# Patient Record
Sex: FEMALE | Race: WHITE | NOT HISPANIC OR LATINO | Employment: FULL TIME
[De-identification: names, ages, dates, MRNs, and addresses within clinical notes are randomized per-mention and may not be internally consistent; named-entity substitution may affect disease eponyms.]

---

## 2018-11-20 PROCEDURE — 87077 CULTURE AEROBIC IDENTIFY: CPT | Performed by: EMERGENCY MEDICINE

## 2018-11-20 PROCEDURE — 87086 URINE CULTURE/COLONY COUNT: CPT | Performed by: EMERGENCY MEDICINE

## 2018-11-20 PROCEDURE — 87186 SC STD MICRODIL/AGAR DIL: CPT | Performed by: EMERGENCY MEDICINE

## 2019-08-12 ENCOUNTER — TELEPHONE (OUTPATIENT)
Dept: SCHEDULING | Age: 29
End: 2019-08-12

## 2019-08-16 ENCOUNTER — OFFICE VISIT (OUTPATIENT)
Dept: FAMILY MEDICINE | Age: 29
End: 2019-08-16

## 2019-08-16 VITALS
HEART RATE: 97 BPM | HEIGHT: 66 IN | SYSTOLIC BLOOD PRESSURE: 106 MMHG | BODY MASS INDEX: 26.84 KG/M2 | OXYGEN SATURATION: 98 % | TEMPERATURE: 97.1 F | DIASTOLIC BLOOD PRESSURE: 64 MMHG | WEIGHT: 167 LBS

## 2019-08-16 DIAGNOSIS — J30.2 SEASONAL ALLERGIES: Primary | ICD-10-CM

## 2019-08-16 PROCEDURE — 99385 PREV VISIT NEW AGE 18-39: CPT | Performed by: FAMILY MEDICINE

## 2019-08-16 RX ORDER — CETIRIZINE HYDROCHLORIDE 5 MG/1
5 TABLET ORAL DAILY
COMMUNITY
End: 2019-08-16 | Stop reason: SDUPTHER

## 2019-08-16 RX ORDER — CETIRIZINE HYDROCHLORIDE 5 MG/1
5 TABLET ORAL DAILY
Qty: 30 TABLET | Refills: 1 | Status: SHIPPED | OUTPATIENT
Start: 2019-08-16

## 2019-08-16 RX ORDER — MONTELUKAST SODIUM 10 MG/1
10 TABLET ORAL DAILY
Qty: 30 TABLET | Refills: 1 | Status: SHIPPED | OUTPATIENT
Start: 2019-08-16 | End: 2019-09-23 | Stop reason: SDUPTHER

## 2019-08-16 RX ORDER — MONTELUKAST SODIUM 10 MG/1
10 TABLET ORAL
COMMUNITY
Start: 2013-02-28 | End: 2019-08-16 | Stop reason: SDUPTHER

## 2019-08-16 SDOH — HEALTH STABILITY: PHYSICAL HEALTH: ON AVERAGE, HOW MANY DAYS PER WEEK DO YOU ENGAGE IN MODERATE TO STRENUOUS EXERCISE (LIKE A BRISK WALK)?: 7 DAYS

## 2019-08-16 SDOH — HEALTH STABILITY: MENTAL HEALTH: HOW OFTEN DO YOU HAVE A DRINK CONTAINING ALCOHOL?: NEVER

## 2019-08-16 SDOH — HEALTH STABILITY: PHYSICAL HEALTH: ON AVERAGE, HOW MANY MINUTES DO YOU ENGAGE IN EXERCISE AT THIS LEVEL?: 60 MIN

## 2019-08-16 ASSESSMENT — PATIENT HEALTH QUESTIONNAIRE - PHQ9
SUM OF ALL RESPONSES TO PHQ9 QUESTIONS 1 AND 2: 0
SUM OF ALL RESPONSES TO PHQ9 QUESTIONS 1 AND 2: 0
2. FEELING DOWN, DEPRESSED OR HOPELESS: NOT AT ALL
1. LITTLE INTEREST OR PLEASURE IN DOING THINGS: NOT AT ALL

## 2019-08-24 PROBLEM — Z00.00 HEALTH MAINTENANCE EXAMINATION: Status: ACTIVE | Noted: 2019-08-24

## 2019-08-24 PROBLEM — J30.2 SEASONAL ALLERGIES: Status: ACTIVE | Noted: 2019-08-24

## 2019-09-23 DIAGNOSIS — J30.2 SEASONAL ALLERGIES: ICD-10-CM

## 2019-09-23 RX ORDER — MONTELUKAST SODIUM 10 MG/1
10 TABLET ORAL DAILY
Qty: 90 TABLET | Refills: 1 | Status: SHIPPED | OUTPATIENT
Start: 2019-09-23 | End: 2020-05-01 | Stop reason: SDUPTHER

## 2020-01-02 ENCOUNTER — OFFICE VISIT (OUTPATIENT)
Dept: INTERNAL MEDICINE | Age: 30
End: 2020-01-02

## 2020-01-02 ENCOUNTER — TELEPHONE (OUTPATIENT)
Dept: SCHEDULING | Age: 30
End: 2020-01-02

## 2020-01-02 VITALS
HEART RATE: 64 BPM | BODY MASS INDEX: 27.76 KG/M2 | TEMPERATURE: 97 F | DIASTOLIC BLOOD PRESSURE: 75 MMHG | WEIGHT: 172 LBS | SYSTOLIC BLOOD PRESSURE: 125 MMHG

## 2020-01-02 DIAGNOSIS — B96.89 ACUTE BACTERIAL RHINOSINUSITIS: Primary | ICD-10-CM

## 2020-01-02 DIAGNOSIS — J02.9 ACUTE PHARYNGITIS, UNSPECIFIED ETIOLOGY: ICD-10-CM

## 2020-01-02 DIAGNOSIS — J01.90 ACUTE BACTERIAL RHINOSINUSITIS: Primary | ICD-10-CM

## 2020-01-02 PROCEDURE — 99203 OFFICE O/P NEW LOW 30 MIN: CPT | Performed by: STUDENT IN AN ORGANIZED HEALTH CARE EDUCATION/TRAINING PROGRAM

## 2020-01-02 RX ORDER — DOXYCYCLINE HYCLATE 100 MG/1
100 CAPSULE ORAL 2 TIMES DAILY
Qty: 14 CAPSULE | Refills: 0 | Status: SHIPPED | OUTPATIENT
Start: 2020-01-02 | End: 2020-01-09

## 2020-01-02 RX ORDER — ESCITALOPRAM OXALATE 10 MG/1
TABLET ORAL
Refills: 0 | COMMUNITY
Start: 2019-10-29

## 2020-01-02 RX ORDER — BENZONATATE 100 MG/1
100 CAPSULE ORAL 3 TIMES DAILY PRN
Qty: 21 CAPSULE | Refills: 0 | Status: SHIPPED | OUTPATIENT
Start: 2020-01-02

## 2020-01-02 ASSESSMENT — ENCOUNTER SYMPTOMS
WEAKNESS: 0
FATIGUE: 1
SINUS PAIN: 1
VOICE CHANGE: 0
WHEEZING: 1
SORE THROAT: 1
NAUSEA: 0
RHINORRHEA: 1
CONSTIPATION: 0
ABDOMINAL PAIN: 0
COUGH: 1
SINUS PRESSURE: 1
HEADACHES: 0
COLOR CHANGE: 0
SHORTNESS OF BREATH: 0
VOMITING: 0
DIARRHEA: 0

## 2020-01-02 ASSESSMENT — PATIENT HEALTH QUESTIONNAIRE - PHQ9
1. LITTLE INTEREST OR PLEASURE IN DOING THINGS: NOT AT ALL
SUM OF ALL RESPONSES TO PHQ9 QUESTIONS 1 AND 2: 0
SUM OF ALL RESPONSES TO PHQ9 QUESTIONS 1 AND 2: 0
2. FEELING DOWN, DEPRESSED OR HOPELESS: NOT AT ALL

## 2020-01-10 ENCOUNTER — TELEPHONE (OUTPATIENT)
Dept: SCHEDULING | Age: 30
End: 2020-01-10

## 2020-01-23 ENCOUNTER — APPOINTMENT (OUTPATIENT)
Dept: FAMILY MEDICINE | Age: 30
End: 2020-01-23

## 2020-05-01 ENCOUNTER — V-VISIT (OUTPATIENT)
Dept: FAMILY MEDICINE | Age: 30
End: 2020-05-01

## 2020-05-01 ENCOUNTER — TELEPHONE (OUTPATIENT)
Dept: SCHEDULING | Age: 30
End: 2020-05-01

## 2020-05-01 DIAGNOSIS — B34.9 VIRAL ILLNESS: Primary | ICD-10-CM

## 2020-05-01 DIAGNOSIS — Z87.09 HISTORY OF ASTHMA: ICD-10-CM

## 2020-05-01 DIAGNOSIS — J30.2 SEASONAL ALLERGIES: ICD-10-CM

## 2020-05-01 PROCEDURE — 99213 OFFICE O/P EST LOW 20 MIN: CPT | Performed by: FAMILY MEDICINE

## 2020-05-01 RX ORDER — MONTELUKAST SODIUM 10 MG/1
10 TABLET ORAL DAILY
Qty: 90 TABLET | Refills: 1 | Status: SHIPPED | OUTPATIENT
Start: 2020-05-01

## 2020-05-01 RX ORDER — ALBUTEROL SULFATE 90 UG/1
2 AEROSOL, METERED RESPIRATORY (INHALATION) EVERY 4 HOURS PRN
Qty: 1 INHALER | Refills: 3 | Status: SHIPPED | OUTPATIENT
Start: 2020-05-01

## 2020-05-01 SDOH — HEALTH STABILITY: MENTAL HEALTH: HOW OFTEN DO YOU HAVE A DRINK CONTAINING ALCOHOL?: NEVER

## 2020-05-01 ASSESSMENT — PATIENT HEALTH QUESTIONNAIRE - PHQ9
SUM OF ALL RESPONSES TO PHQ9 QUESTIONS 1 AND 2: 0
1. LITTLE INTEREST OR PLEASURE IN DOING THINGS: NOT AT ALL
2. FEELING DOWN, DEPRESSED OR HOPELESS: NOT AT ALL
SUM OF ALL RESPONSES TO PHQ9 QUESTIONS 1 AND 2: 0

## 2020-05-01 ASSESSMENT — PAIN SCALES - GENERAL: PAINLEVEL: 0

## 2020-12-11 ENCOUNTER — TELEPHONE (OUTPATIENT)
Dept: SCHEDULING | Age: 30
End: 2020-12-11

## 2020-12-14 ENCOUNTER — OFFICE VISIT (OUTPATIENT)
Dept: FAMILY MEDICINE | Age: 30
End: 2020-12-14

## 2020-12-14 ENCOUNTER — TELEPHONE (OUTPATIENT)
Dept: SCHEDULING | Age: 30
End: 2020-12-14

## 2020-12-14 VITALS
WEIGHT: 177.8 LBS | SYSTOLIC BLOOD PRESSURE: 127 MMHG | DIASTOLIC BLOOD PRESSURE: 80 MMHG | HEART RATE: 62 BPM | TEMPERATURE: 97.9 F | BODY MASS INDEX: 28.7 KG/M2

## 2020-12-14 DIAGNOSIS — Q83.9 BREAST ANOMALY: Primary | ICD-10-CM

## 2020-12-14 PROCEDURE — 99214 OFFICE O/P EST MOD 30 MIN: CPT | Performed by: FAMILY MEDICINE

## 2020-12-23 ENCOUNTER — HOSPITAL ENCOUNTER (OUTPATIENT)
Dept: MAMMOGRAPHY | Age: 30
Discharge: HOME | End: 2020-12-23
Attending: FAMILY MEDICINE

## 2020-12-23 ENCOUNTER — TELEPHONE (OUTPATIENT)
Dept: SCHEDULING | Age: 30
End: 2020-12-23

## 2020-12-23 DIAGNOSIS — Q83.9 BREAST ANOMALY: ICD-10-CM

## 2020-12-23 PROCEDURE — 77066 DX MAMMO INCL CAD BI: CPT

## 2020-12-23 PROCEDURE — 76641 ULTRASOUND BREAST COMPLETE: CPT

## 2020-12-28 ENCOUNTER — TELEPHONE (OUTPATIENT)
Dept: FAMILY MEDICINE | Age: 30
End: 2020-12-28

## 2020-12-28 DIAGNOSIS — Q83.9 BREAST ANOMALY: Primary | ICD-10-CM

## 2021-01-01 ENCOUNTER — EXTERNAL RECORD (OUTPATIENT)
Dept: HEALTH INFORMATION MANAGEMENT | Age: 31
End: 2021-01-01

## 2021-01-04 ENCOUNTER — OFFICE VISIT (OUTPATIENT)
Dept: SURGERY | Age: 31
End: 2021-01-04
Attending: SURGERY

## 2021-01-04 VITALS
DIASTOLIC BLOOD PRESSURE: 75 MMHG | HEART RATE: 60 BPM | SYSTOLIC BLOOD PRESSURE: 126 MMHG | BODY MASS INDEX: 27.32 KG/M2 | HEIGHT: 66 IN | WEIGHT: 170 LBS | TEMPERATURE: 98.8 F

## 2021-01-04 DIAGNOSIS — N64.4 BREAST PAIN: Primary | ICD-10-CM

## 2021-01-04 PROCEDURE — 99212 OFFICE O/P EST SF 10 MIN: CPT

## 2021-01-04 PROCEDURE — 99203 OFFICE O/P NEW LOW 30 MIN: CPT | Performed by: SURGERY

## 2021-01-04 SDOH — HEALTH STABILITY: MENTAL HEALTH: HOW OFTEN DO YOU HAVE A DRINK CONTAINING ALCOHOL?: NEVER

## 2021-01-05 ENCOUNTER — APPOINTMENT (OUTPATIENT)
Dept: MAMMOGRAPHY | Age: 31
End: 2021-01-05
Attending: FAMILY MEDICINE

## 2021-01-05 ENCOUNTER — APPOINTMENT (OUTPATIENT)
Dept: ULTRASOUND IMAGING | Age: 31
End: 2021-01-05
Attending: FAMILY MEDICINE

## 2021-04-23 ENCOUNTER — TELEPHONE (OUTPATIENT)
Dept: SCHEDULING | Age: 31
End: 2021-04-23

## 2021-04-23 ENCOUNTER — OFFICE VISIT (OUTPATIENT)
Dept: INTERNAL MEDICINE | Age: 31
End: 2021-04-23

## 2021-04-23 DIAGNOSIS — N30.00 ACUTE CYSTITIS WITHOUT HEMATURIA: Primary | ICD-10-CM

## 2021-04-23 LAB
APPEARANCE, POC: CLEAR
BILIRUBIN, POC: NEGATIVE
COLOR, POC: YELLOW
GLUCOSE UR-MCNC: NEGATIVE MG/DL
KETONES, POC: NEGATIVE MG/DL
NITRITE, POC: NEGATIVE
OCCULT BLOOD, POC: NORMAL
PH UR: 5.5 [PH] (ref 5–7)
PROT UR-MCNC: NEGATIVE MG/DL
SP GR UR: 1.01 (ref 1–1.03)
UROBILINOGEN UR-MCNC: 0.2 MG/DL (ref 0–1)
WBC (LEUKOCYTE) ESTERASE, POC: NORMAL

## 2021-04-23 PROCEDURE — 81002 URINALYSIS NONAUTO W/O SCOPE: CPT | Performed by: INTERNAL MEDICINE

## 2021-04-23 PROCEDURE — 81001 URINALYSIS AUTO W/SCOPE: CPT | Performed by: INTERNAL MEDICINE

## 2021-04-23 PROCEDURE — 99213 OFFICE O/P EST LOW 20 MIN: CPT | Performed by: INTERNAL MEDICINE

## 2021-04-23 PROCEDURE — 87086 URINE CULTURE/COLONY COUNT: CPT | Performed by: INTERNAL MEDICINE

## 2021-04-23 RX ORDER — FLUCONAZOLE 150 MG/1
150 TABLET ORAL ONCE
Qty: 1 TABLET | Refills: 0 | Status: SHIPPED | OUTPATIENT
Start: 2021-04-23 | End: 2021-04-23

## 2021-04-23 RX ORDER — NITROFURANTOIN 25; 75 MG/1; MG/1
100 CAPSULE ORAL 2 TIMES DAILY
Qty: 10 CAPSULE | Refills: 0 | Status: SHIPPED | OUTPATIENT
Start: 2021-04-23 | End: 2021-04-28

## 2021-04-23 ASSESSMENT — PATIENT HEALTH QUESTIONNAIRE - PHQ9
SUM OF ALL RESPONSES TO PHQ9 QUESTIONS 1 AND 2: 0
1. LITTLE INTEREST OR PLEASURE IN DOING THINGS: NOT AT ALL
CLINICAL INTERPRETATION OF PHQ2 SCORE: NO FURTHER SCREENING NEEDED
SUM OF ALL RESPONSES TO PHQ9 QUESTIONS 1 AND 2: 0
2. FEELING DOWN, DEPRESSED OR HOPELESS: NOT AT ALL
CLINICAL INTERPRETATION OF PHQ9 SCORE: NO FURTHER SCREENING NEEDED

## 2021-04-23 ASSESSMENT — PAIN SCALES - GENERAL: PAINLEVEL: 1-2

## 2021-04-24 LAB
APPEARANCE UR: CLEAR
BACTERIA #/AREA URNS HPF: ABNORMAL /HPF
BILIRUB UR QL STRIP: NEGATIVE
COLOR UR: ABNORMAL
GLUCOSE UR STRIP-MCNC: NEGATIVE MG/DL
HGB UR QL STRIP: ABNORMAL
HYALINE CASTS #/AREA URNS LPF: ABNORMAL /LPF
KETONES UR STRIP-MCNC: NEGATIVE MG/DL
LEUKOCYTE ESTERASE UR QL STRIP: ABNORMAL
NITRITE UR QL STRIP: NEGATIVE
PH UR STRIP: 7 [PH] (ref 5–7)
PROT UR STRIP-MCNC: NEGATIVE MG/DL
RBC #/AREA URNS HPF: ABNORMAL /HPF
SP GR UR STRIP: <1.005 (ref 1–1.03)
SQUAMOUS #/AREA URNS HPF: ABNORMAL /HPF
UROBILINOGEN UR STRIP-MCNC: 0.2 MG/DL
WBC #/AREA URNS HPF: ABNORMAL /HPF

## 2021-04-25 LAB — BACTERIA UR CULT: NORMAL

## 2021-04-27 ENCOUNTER — APPOINTMENT (OUTPATIENT)
Dept: FAMILY MEDICINE | Age: 31
End: 2021-04-27

## 2021-05-25 VITALS
SYSTOLIC BLOOD PRESSURE: 113 MMHG | WEIGHT: 167.55 LBS | DIASTOLIC BLOOD PRESSURE: 65 MMHG | OXYGEN SATURATION: 99 % | HEART RATE: 54 BPM | TEMPERATURE: 97.9 F | BODY MASS INDEX: 26.93 KG/M2 | HEIGHT: 66 IN

## 2024-11-20 NOTE — PROGRESS NOTES
EMILY 2025, by Embryo Transfer 26w1d here today for MIKIE visit. Baby active. Denies cramping, LOF or bleeding.     Patient states she was placed on Synthroid when undergoing IVF and does not have a history of hypothyroidism. It was recommended that she continue until 12 weeks gestation. Patient self weaned from Synthroid due to nausea and is no longer taking. Caryl Ricketts ordered TSH and fT4 labs in September which were normal. Caryl recommended the patient still follow up with an endocrinologist. Patient states today she does not want to follow up with endocrinology due to the \"Synthroid only being for IVF treatment.\"     Plans natural childbirth, is signed up for prenatal classes. Is interested in hypobirthing and was given list of prenatal classes/hypnobirthing resources from [facility].     Declines flu and COVID vaccine    Instructed on 3rd trimester labs and TDAP Vaccine  Has 32 week growth US and BPP scheduled with [facility]          ICD-10-CM    1. Pregnancy resulting from assisted reproductive technology in second trimester ([facility])  O09.812       2. Obesity in pregnancy ([facility])  O99.210         Warning signs, kick counts reviewed and patient questions answered.     RTC in 2 weeks for 28 week visit.     DISHA Farley under direct supervision and in collaboration with Coral Tamez CNM   I personally performed the patient's exam and medical decision making on this date of service.  I was physically present in the room for the performance of the E/M service.  I have reviewed the FRANCY student's documentation and findings including history, Exam, and Medical Decision Making, edited the document for accuracy and verify that it represents the clinical findings and services performed.

## 2024-11-22 NOTE — TELEPHONE ENCOUNTER
Patient called in is 26 weeks pregnant, is having sharp pain on the lower right side below her belly button.   Request for a nurse to call

## 2024-11-22 NOTE — TELEPHONE ENCOUNTER
Name and  verified. Sharp and dull pain near your pelvis on the right side. Aches, when she presses it. Active fetal movement. No complaints of bleeding, loss of fluids, or cramping. No headaches, blurry vision, spots in the vision. No sex recently. Urinating and BM are normal. Patient did have excessive diarrhea last night but has been able to eat and drink okay today. This RN recommended her continuing to watch the pain and if the pain gets worse, doesn't feel baby move as much, or have any vaginal bleeding, to call us right away or be directed to the ED. Patient states understanding.

## 2024-12-05 NOTE — PROGRESS NOTES
Kitty, , is at 28w2d, here for her return OB visit.  Currently, she is feeling ok. Denies contractions VB or LOF. Endorses active fetus. Feeling anxious about diabetes test and possibility of needing to transfer out of practice. Started seeing therapist yesterday and feels that did help with her anxiety.      Vital signs and weight reviewed  See flowsheets     Assessment/Plan:   3T labs ordered and pt plans to complete today. Discussed that the 1h GTT is a screening test and if she fails she will need to complete the 3 hour test. If she were to fail the 3h test and be diagnosed as GDM she is not automatically transferred. Pt felt reassured by this information.  Tdap recommendation reviewed, pt would like to complete next visit.  28w packet provided, CBE resources reviewed as well as  recommendations    Next visit: 2 weeks    Reviewed:   Prenatal visit schedule  Recommendations and rationale for Tdap & RSV vaccine(s) in pregnancy  3rd trimester precautions and expectations   labor precautions  Kick counts  Danger signs      I have spent 20 minutes total time on the day of the encounter, including: preparing to see the patient, ordering/reviewing labs, performing a medically appropriate exam, and providing care coordination. face to face counseling, chart review, orders and coordination of care    Pt verbalized understanding. All questions answered. No barriers to learning identified

## 2024-12-05 NOTE — PATIENT INSTRUCTIONS
Understanding  Labor  Going into labor before your 37th week of pregnancy is called  labor.  labor can cause your baby to be born too soon. This can lead to a number of health problems that may affect your baby.      Before labor, the cervix is thick and closed.      In  labor, the cervix begins to efface (thin) and dilate (open).   Symptoms of  labor   If you think you’re having  labor, get medical help right away. Contractions alone don’t mean you’re in  labor. What matters more are changes in your cervix (the lower end of the uterus). Symptoms of  labor include:   Four or more contractions per hour  Strong contractions  Constant menstrual-like cramping  Low-back pain  Mucous or bloody vaginal discharge  Bleeding or spotting in the second or third trimester  Evaluating  labor   Your healthcare provider will try to find out whether you’re in  labor or whether you’re just having contractions. He or she may watch you for a few hours. The following tests may be done:   Pelvic exam to see if your cervix has effaced (thinned) and dilated (opened)  Uterine activity monitoring to detect contractions  Fetal monitoring to check the health of your baby  Ultrasound to check your baby’s size and position  Amniocentesis to check how mature your baby’s lungs are  Caring for yourself at home   If you have  contractions, but your cervix is still thick and closed, your healthcare provider may ask you to do the following at home:   Drink plenty of water.  Do fewer activities.  Rest in bed on your side.  Don't have intercourse or nipple stimulation.  When to call your healthcare provider   Call your healthcare provider if you notice any of these:   Four or more contractions per hour  Bag of water breaks  Bleeding or spotting  If you need hospital care    labor often requires that you have hospital care and complete bed rest. You may have an IV  (intravenous) line to get fluids. You may be given pills or injections to help prevent contractions. Finally, you may get medicine (corticosteroids) that helps your baby’s lungs mature more quickly.   Are you at risk?   Any pregnant woman can have  labor. It may start for no reason. But these risk factors can increase your chances:   Past  labor or past early birth  Smoking, drug, or alcohol use during pregnancy  Multiple fetuses (twins or more)  Problems with the shape of the uterus  Bleeding during the pregnancy  The dangers of  birth   A baby born too soon may have health problems. This is because the baby didn’t have enough time to mature. Some of the risks for your baby include:   Not breastfeeding or feeding well  Having immature lungs  Bleeding in the brain  Dying  Reaching term   Your goal is to get as close to term as you can before giving birth. The closer you get to term, the higher your chance of having a healthy baby. Work with your healthcare provider. Together, you can take steps that may keep you from giving birth too early.   [employer] last reviewed this educational content on 3/1/2019  © 8839-0992 [employer]. [address] [place] [address]. All rights reserved. This information is not intended as a substitute for professional medical care. Always follow your healthcare professional's instructions.        Premature Labor    Premature labor ( labor) is when symptoms of labor occur before 37 weeks of pregnancy. (This is 3 weeks before your due date.) Premature labor can lead to premature delivery. This means giving birth to your baby early. Babies need at least 37 weeks of pregnancy for all the organs to develop normally. The earlier the delivery, the greater the risks to the baby.  In most cases, the cause of premature labor is unknown. But certain factors may make the problem more likely. These include:  History of premature labor with other  pregnancies  Smoking  Alcohol or substance abuse  Low pre-pregnancy weight or weight gain during pregnancy  Short time period between pregnancies  Being pregnant with twins, triplets, or more  History of certain types of surgery on the cervix or uterus  Having a short cervix  Certain infections  There are a number of other risk factors. Ask your healthcare provider to help you understand the risk factors specific to your case. Then find out what you can do to control or reduce them.  Contractions are one of the main signs of premature labor. A contraction is different from cramping. It may feel painful and the belly (abdomen) may get hard. It can last from a few seconds to a few minutes. Some women may feel only a sense of pressure in the belly, thighs, rectum, or vagina. Some may feel only the hardening of the uterus without pain or pressure. Or there may be a constant pain in the lower back, which spreads forward toward the belly.Premature labor is often treated with medicines. A hospital stay may be needed. If labor doesn't progress and you and your baby are both healthy, you may be discharged to continue care at home.  Home care  Ask your provider any questions you have. Be certain you understand how to care for yourself at home. Also follow all recommendations given by your healthcare providers.  Learn the signs of premature labor. Watch for these signs when you get home.  Limit or restrict activities as advised. This may include stopping certain physical activities and cutting back hours at work.  Avoid doing strenuous work as directed by your provider. Ask family and friends for help with tasks and support at home, if needed.  Don’t smoke, drink alcohol, or use other harmful substances.  Take steps to reduce stress.  Report any unusual symptoms to your provider.    Follow-up care  Follow up with your healthcare provider, or as directed. Weekly visits with your provider may be needed.  When to seek medical  advice  Call your healthcare provider right away if any of these occur:  Regular or frequent contractions, whether they are painful or not  Pressure in the pelvis  Pressure in the lower belly or mild cramping in your belly with or without diarrhea  Constant low, dull backache  Gush or slow leaking of water from your vagina  Change in vaginal discharge (watery, mucus, or bloody)  Any vaginal bleeding  Decreased movement of your baby  [employer] last reviewed this educational content on 6/1/2018 © 2000-2020 [employer]. [address] [place] [address]. All rights reserved. This information is not intended as a substitute for professional medical care. Always follow your healthcare professional's instructions.        Understanding Preeclampsia  Preeclampsia is high blood pressure (hypertension) that happens during pregnancy. It often shows up around the 20th week of pregnancy. It often goes back to normal by the 12th week after you give birth. It can lead to serious health risks for you and your baby. During your pregnancy, your healthcare provider will watch your blood pressure.    Symptoms  A common symptom of preeclampsia is high blood pressure. Other symptoms may include:  Rapid weight gain  Protein in your urine  Headache  Belly (abdominal) pain on your right side  Vision problems. These include flashes or spots.  Swelling (edema) in your face or hands. This also often happens near the end of normal pregnancies, even without preeclampsia.  Tests you may have  Your healthcare provider will want to check your blood pressure throughout your pregnancy. If your blood pressure is high, you may have the following tests:  Urine tests to look for protein  Blood tests to confirm preeclampsia  Fetal monitoring to make sure that your baby is healthy  Treating preeclampsia  You may need to take a daily low dose of aspirin if you are at risk for preeclampsia. Preeclampsia almost always ends soon after you  give birth. Until then, your healthcare provider can help manage your condition. If your symptoms are mild, you may need activity limits at home, including bed rest and no heavy lifting. If your symptoms are severe, you will stay in the hospital. Hospital treatment includes:  Activity limits to help control blood pressure. This means no heavy lifting and 8 hours per day lying down with the feet up.  Magnesium IV (intravenous) drip during labor to prevent seizures  Induced labor or surgical delivery by  section. Delivery is considered the cure for preeclampsia.  When to call your healthcare provider  Call your healthcare provider if swelling, weight gain, or other symptoms come on quickly or are severe. Some cases of preeclampsia are more severe than others. Your symptoms also may change or get worse as you get closer to your due date.  Who’s at risk?  No one knows what causes preeclampsia. Preeclampsia can happen in any pregnant woman. But it is more common in first-time pregnancies. Things that increase the risk include:  Previous pregnancies. You are at risk if you had preeclampsia, intrauterine growth retardation (IUGR),  birth, placental abruption, or fetal death in a past pregnancy.  Health history of mother. You are at risk if you have diabetes, high blood pressure, obesity, kidney disease, autoimmune disease such as lupus, or a family history of preeclampsia.  Current pregnancy. You are at risk if this is your first pregnancy, or if you have multiple fetuses, are younger than age 18 or older than 40, or used in vitro fertilization.  Race. You are at risk if you are black.  Dangers of preeclampsia  If not treated, preeclampsia can cause problems for you and your baby. The placenta is the organ that nourishes your baby. It may tear away from the uterine wall. This can put the baby at risk for health problems (fetal distress) and premature birth. Preeclampsia can also cause these health  problems:  Kidney failure or other organ damage  Seizures  Stroke  Once you give birth  In most cases, preeclampsia goes away on its own soon after you give birth. This is often by the 12th week after you give deliver. Within days of delivery, your blood pressure, swelling, and other symptoms should get better. For some women, problems from preeclampsia can continue after delivery.  Postpartum preeclampsia that develops within the first 48 hours after delivery is rare. Another type of postpartum preeclampsia that develops more than 48 hours after delivery is called late-onset preeclampsia. It is also rare. Contact your healthcare provider right away if you have symptoms of preeclampsia after you deliver.  [employer] last reviewed this educational content on 12/1/2019 © 2000-2020 [employer]. [address] [place] [address], [place] [address]. All rights reserved. This information is not intended as a substitute for professional medical care. Always follow your healthcare professional's instructions.        Kick Counts    It’s normal to worry about your baby’s health. One way you can know your baby’s doing well is to record the baby’s movements once a day. This is called a kick count. Remember to take your kick count records to all your appointments with your healthcare provider.  How to count kicks  Time how long it takes you to feel 10 kicks, flutters, swishes, or rolls. Ideally, you want to feel at least 10 movements within 2 hours. You will likely feel 10 movements in less time than that.  Starting at 28 weeks, count your baby's movements daily. Follow your healthcare provider's instructions for kick counting. Here are tips for counting kicks:  Choose a time when the baby is active, such as after a meal.   Sit comfortably or lie on your side.   The first time the baby moves, write down the time.   Count each movement until the baby has moved 10 times. This can take from 20 minutes to 2 hours.   If you have  not felt 10 kicks by the end of the second hour, wait a few hours. Then try again.  Try to do it at the same time each day.  When to call your healthcare provider  Call your healthcare provider right away if:  You do a couple sets of kick counts during the day and your baby moves fewer than 10 times in 2 hours  Your baby moves much less often than on the days before.  You have not felt your baby move all day.  [employer] last reviewed this educational content on 12/1/2017 © 2000-2020 The [employer]. [address] [place] [address]. All rights reserved. This information is not intended as a substitute for professional medical care. Always follow your healthcare professional's instructions.

## 2025-01-02 NOTE — PROGRESS NOTES
Kitty, , is at 32w2d, here for her return OB visit.  Currently, she is feeling well. Denies contractions, VB or LOF. Endorses active fetus. Having some pelvic pain in the mornings when she wakes up. Sometimes its achey, sometimes sharp. Starting to get anxious about birth due to her best friend dying in childbirth. She is signed up for CBE classes and is looking into hiring a .      Vital signs and weight reviewed  See flowsheets   Declines Tdap and RSV vaccine in pregnancy    Assessment/Plan:   Vaccine recommendations reviewed and pt declines   Has growth US later today with [facility]  Pelvic PT referral placed for pelvic pain in pregnancy. Warning signs reviewed.    Next visit: 2 weeks    Reviewed:   Prenatal visit schedule  Recommendations and rationale for Tdap & RSV vaccine(s) in pregnancy  3rd trimester precautions and expectations   labor precautions  Kick counts  Danger signs      I have spent 20 minutes total time on the day of the encounter, including: preparing to see the patient, ordering/reviewing labs, performing a medically appropriate exam, and providing care coordination. face to face counseling, chart review, orders and coordination of care    Pt verbalized understanding. All questions answered. No barriers to learning identified

## 2025-01-02 NOTE — PATIENT INSTRUCTIONS
Understanding  Labor  Going into labor before your 37th week of pregnancy is called  labor.  labor can cause your baby to be born too soon. This can lead to a number of health problems that may affect your baby.      Before labor, the cervix is thick and closed.      In  labor, the cervix begins to efface (thin) and dilate (open).   Symptoms of  labor   If you think you’re having  labor, get medical help right away. Contractions alone don’t mean you’re in  labor. What matters more are changes in your cervix (the lower end of the uterus). Symptoms of  labor include:   Four or more contractions per hour  Strong contractions  Constant menstrual-like cramping  Low-back pain  Mucous or bloody vaginal discharge  Bleeding or spotting in the second or third trimester  Evaluating  labor   Your healthcare provider will try to find out whether you’re in  labor or whether you’re just having contractions. He or she may watch you for a few hours. The following tests may be done:   Pelvic exam to see if your cervix has effaced (thinned) and dilated (opened)  Uterine activity monitoring to detect contractions  Fetal monitoring to check the health of your baby  Ultrasound to check your baby’s size and position  Amniocentesis to check how mature your baby’s lungs are  Caring for yourself at home   If you have  contractions, but your cervix is still thick and closed, your healthcare provider may ask you to do the following at home:   Drink plenty of water.  Do fewer activities.  Rest in bed on your side.  Don't have intercourse or nipple stimulation.  When to call your healthcare provider   Call your healthcare provider if you notice any of these:   Four or more contractions per hour  Bag of water breaks  Bleeding or spotting  If you need hospital care    labor often requires that you have hospital care and complete bed rest. You may have an IV  (intravenous) line to get fluids. You may be given pills or injections to help prevent contractions. Finally, you may get medicine (corticosteroids) that helps your baby’s lungs mature more quickly.   Are you at risk?   Any pregnant woman can have  labor. It may start for no reason. But these risk factors can increase your chances:   Past  labor or past early birth  Smoking, drug, or alcohol use during pregnancy  Multiple fetuses (twins or more)  Problems with the shape of the uterus  Bleeding during the pregnancy  The dangers of  birth   A baby born too soon may have health problems. This is because the baby didn’t have enough time to mature. Some of the risks for your baby include:   Not breastfeeding or feeding well  Having immature lungs  Bleeding in the brain  Dying  Reaching term   Your goal is to get as close to term as you can before giving birth. The closer you get to term, the higher your chance of having a healthy baby. Work with your healthcare provider. Together, you can take steps that may keep you from giving birth too early.   [employer] last reviewed this educational content on 3/1/2019  © 6410-5184 [employer]. [address] [place] [address]. All rights reserved. This information is not intended as a substitute for professional medical care. Always follow your healthcare professional's instructions.        Premature Labor    Premature labor ( labor) is when symptoms of labor occur before 37 weeks of pregnancy. (This is 3 weeks before your due date.) Premature labor can lead to premature delivery. This means giving birth to your baby early. Babies need at least 37 weeks of pregnancy for all the organs to develop normally. The earlier the delivery, the greater the risks to the baby.  In most cases, the cause of premature labor is unknown. But certain factors may make the problem more likely. These include:  History of premature labor with other  pregnancies  Smoking  Alcohol or substance abuse  Low pre-pregnancy weight or weight gain during pregnancy  Short time period between pregnancies  Being pregnant with twins, triplets, or more  History of certain types of surgery on the cervix or uterus  Having a short cervix  Certain infections  There are a number of other risk factors. Ask your healthcare provider to help you understand the risk factors specific to your case. Then find out what you can do to control or reduce them.  Contractions are one of the main signs of premature labor. A contraction is different from cramping. It may feel painful and the belly (abdomen) may get hard. It can last from a few seconds to a few minutes. Some women may feel only a sense of pressure in the belly, thighs, rectum, or vagina. Some may feel only the hardening of the uterus without pain or pressure. Or there may be a constant pain in the lower back, which spreads forward toward the belly.Premature labor is often treated with medicines. A hospital stay may be needed. If labor doesn't progress and you and your baby are both healthy, you may be discharged to continue care at home.  Home care  Ask your provider any questions you have. Be certain you understand how to care for yourself at home. Also follow all recommendations given by your healthcare providers.  Learn the signs of premature labor. Watch for these signs when you get home.  Limit or restrict activities as advised. This may include stopping certain physical activities and cutting back hours at work.  Avoid doing strenuous work as directed by your provider. Ask family and friends for help with tasks and support at home, if needed.  Don’t smoke, drink alcohol, or use other harmful substances.  Take steps to reduce stress.  Report any unusual symptoms to your provider.    Follow-up care  Follow up with your healthcare provider, or as directed. Weekly visits with your provider may be needed.  When to seek medical  advice  Call your healthcare provider right away if any of these occur:  Regular or frequent contractions, whether they are painful or not  Pressure in the pelvis  Pressure in the lower belly or mild cramping in your belly with or without diarrhea  Constant low, dull backache  Gush or slow leaking of water from your vagina  Change in vaginal discharge (watery, mucus, or bloody)  Any vaginal bleeding  Decreased movement of your baby  [employer] last reviewed this educational content on 6/1/2018 © 2000-2020 The "[employer]. [address] [place] [address]. All rights reserved. This information is not intended as a substitute for professional medical care. Always follow your healthcare professional's instructions.        Understanding Preeclampsia  Preeclampsia is high blood pressure (hypertension) that happens during pregnancy. It often shows up around the 20th week of pregnancy. It often goes back to normal by the 12th week after you give birth. It can lead to serious health risks for you and your baby. During your pregnancy, your healthcare provider will watch your blood pressure.    Symptoms  A common symptom of preeclampsia is high blood pressure. Other symptoms may include:  Rapid weight gain  Protein in your urine  Headache  Belly (abdominal) pain on your right side  Vision problems. These include flashes or spots.  Swelling (edema) in your face or hands. This also often happens near the end of normal pregnancies, even without preeclampsia.  Tests you may have  Your healthcare provider will want to check your blood pressure throughout your pregnancy. If your blood pressure is high, you may have the following tests:  Urine tests to look for protein  Blood tests to confirm preeclampsia  Fetal monitoring to make sure that your baby is healthy  Treating preeclampsia  You may need to take a daily low dose of aspirin if you are at risk for preeclampsia. Preeclampsia almost always ends soon after you  give birth. Until then, your healthcare provider can help manage your condition. If your symptoms are mild, you may need activity limits at home, including bed rest and no heavy lifting. If your symptoms are severe, you will stay in the hospital. Hospital treatment includes:  Activity limits to help control blood pressure. This means no heavy lifting and 8 hours per day lying down with the feet up.  Magnesium IV (intravenous) drip during labor to prevent seizures  Induced labor or surgical delivery by  section. Delivery is considered the cure for preeclampsia.  When to call your healthcare provider  Call your healthcare provider if swelling, weight gain, or other symptoms come on quickly or are severe. Some cases of preeclampsia are more severe than others. Your symptoms also may change or get worse as you get closer to your due date.  Who’s at risk?  No one knows what causes preeclampsia. Preeclampsia can happen in any pregnant woman. But it is more common in first-time pregnancies. Things that increase the risk include:  Previous pregnancies. You are at risk if you had preeclampsia, intrauterine growth retardation (IUGR),  birth, placental abruption, or fetal death in a past pregnancy.  Health history of mother. You are at risk if you have diabetes, high blood pressure, obesity, kidney disease, autoimmune disease such as lupus, or a family history of preeclampsia.  Current pregnancy. You are at risk if this is your first pregnancy, or if you have multiple fetuses, are younger than age 18 or older than 40, or used in vitro fertilization.  Race. You are at risk if you are black.  Dangers of preeclampsia  If not treated, preeclampsia can cause problems for you and your baby. The placenta is the organ that nourishes your baby. It may tear away from the uterine wall. This can put the baby at risk for health problems (fetal distress) and premature birth. Preeclampsia can also cause these health  problems:  Kidney failure or other organ damage  Seizures  Stroke  Once you give birth  In most cases, preeclampsia goes away on its own soon after you give birth. This is often by the 12th week after you give deliver. Within days of delivery, your blood pressure, swelling, and other symptoms should get better. For some women, problems from preeclampsia can continue after delivery.  Postpartum preeclampsia that develops within the first 48 hours after delivery is rare. Another type of postpartum preeclampsia that develops more than 48 hours after delivery is called late-onset preeclampsia. It is also rare. Contact your healthcare provider right away if you have symptoms of preeclampsia after you deliver.  [employer] last reviewed this educational content on 12/1/2019 © 2000-2020 The [employer]. [address] [place] [address], [place] [address]. All rights reserved. This information is not intended as a substitute for professional medical care. Always follow your healthcare professional's instructions.        Kick Counts    It’s normal to worry about your baby’s health. One way you can know your baby’s doing well is to record the baby’s movements once a day. This is called a kick count. Remember to take your kick count records to all your appointments with your healthcare provider.  How to count kicks  Time how long it takes you to feel 10 kicks, flutters, swishes, or rolls. Ideally, you want to feel at least 10 movements within 2 hours. You will likely feel 10 movements in less time than that.  Starting at 28 weeks, count your baby's movements daily. Follow your healthcare provider's instructions for kick counting. Here are tips for counting kicks:  Choose a time when the baby is active, such as after a meal.   Sit comfortably or lie on your side.   The first time the baby moves, write down the time.   Count each movement until the baby has moved 10 times. This can take from 20 minutes to 2 hours.   If you have  not felt 10 kicks by the end of the second hour, wait a few hours. Then try again.  Try to do it at the same time each day.  When to call your healthcare provider  Call your healthcare provider right away if:  You do a couple sets of kick counts during the day and your baby moves fewer than 10 times in 2 hours  Your baby moves much less often than on the days before.  You have not felt your baby move all day.  [employer] last reviewed this educational content on 12/1/2017 © 2000-2020 [employer]. [address] [place] [address]. All rights reserved. This information is not intended as a substitute for professional medical care. Always follow your healthcare professional's instructions.

## 2025-01-02 NOTE — PROGRESS NOTES
Outpatient Maternal-Fetal Medicine Follow-Up    Dear Ms. . Jamarcus    Thank you for requesting ultrasound evaluation and maternal fetal medicine consultation on your patient Kitty Abrams.  As you are aware she is a 34 year old female  with a hines pregnancy and an Estimated Date of Delivery: 25.  She returned to maternal-fetal medicine today for a follow-up visit.  Her history was reviewed from her prior visit and there were no interval changes.    Hre with her mom.  Feeling good.  Antepartum Risk Factors  Obesity  IVF pregnancy    PHYSICAL EXAMINATION:  /77   Pulse 99   Wt 228 lb (103.4 kg)   BMI 36.80 kg/m²   General: alert and oriented, no acute distress  Abdomen: gravid, soft, non-tender      OBSTETRIC ULTRASOUND    Ultrasound Findings:  Single IUP in cephalic presentation.    Placenta is anterior, high.   A 3 vessel cord is noted.  Cardiac activity is present at 136 bpm  EFW 2224 g ( 4 lb 14 oz); 66%.    DANITA is  9.8 cm.  MVP is 3.6 cm  BPP is 8/8.     The fetal measurements are consistent with established EDC. No gross ultrasound evidence of structural abnormalities are seen today. The patient understands that ultrasound cannot rule out all structural and chromosomal abnormalities.   See PACS/Imaging Tab For Complete Ultrasound Report  I interpreted the results and reviewed them with the patient.    DISCUSSION  During her visit we discussed and reviewed the following issues:  OBESITY  See prior MFM notes for a detailed review.    IVF GESTATION  See prior [facility] notes for a detailed review.    GLUCOSE 1HR OB   Date Value Ref Range Status   2024 117 See comment mg/dL Final     Comment:     If the plasma glucose level measured after 1 hour is >=130, 135 or 140 mg/dl proceed to \"Glucose Tolerance, 100 gm (0 hr, 1 hr, 2hr, 3hr), Gestational (ADA)\" test on a separate day, as clinically indicated.          Signs and symptoms of postpartum depression/anxiety discussed.  She is doing  well currently.    Signs and symptoms of preeclampsia were reviewed.   IMPRESSION:  IUP at 32w2d   Obesity  IVF pregnancy     RECOMMENDATIONS:  Continue care with Ms. Ricketts  Weekly NSTs at 36 weeks  11-20 lb weight gain for pregnancy  Recommend continuing sertraline for the pregnancy. Planning on increasing in postpartum period  Recommnded that she schedule f/u with PCP for postpartum planning for sertraline.    Thank you for allowing me to participate in the care of your patient.  Please do not hesitate to contact me if additional questions or concerns arise.      Oumou Mayo MD     30 minutes spent in review of records, patient consultation, documentation and coordination of care.  The relevant clinical matter(s) are summarized above.     Note to patient and family  The 21st Century Cures Act makes medical notes available to patients in the interest of transparency.  However, please be advised that this is a medical document.  It is intended as [id number]-gimp communication.  It is written and medical language may contain abbreviations or verbiage that are technical and unfamiliar.  It may appear blunt or direct.  Medical documents are intended to carry relevant information, facts as evident, and the clinical opinion of the practitioner.

## 2025-01-16 NOTE — PROGRESS NOTES
Here for MIKIE visit.      No LMP recorded. Patient is pregnant. 2025, by Embryo Transfer 34w3d     Reports decreased FM since Saturday. Denies contractions, LOF or bleeding. To triage for NST today. Triage RN and CNM on call notified    Reports odor to urine- will dip to check for infection    Labs:3rd tri labs completed. TSH ordered- last checked in September  Ultrasound: - cephalic, anterior high placenta, 3VC, EFW 4# 14 oz 66%, DANITA 9.8, BPP   Tdap, Flu & RSV vaccines- Declined Tdap & RSV vaccines declines    Has NST's scheduled    ICD-10-CM    1. Prenatal care in third trimester (HCC)  Z34.93           Fetal kick counts, warning signs and signs PTL reviewed.    GBS nv

## 2025-01-17 NOTE — PROGRESS NOTES
Pt is a 34 year old female admitted to TR3/TR3-A.     Chief Complaint   Patient presents with    Decreased Fetal Movement     Pt reports decreased in fetal movement. Last felt movement this morning      Pt is  34w3d intra-uterine pregnancy.  History obtained, consents signed. Oriented to room, staff, and plan of care.

## 2025-01-17 NOTE — TRIAGE
[facility]  part of [facility]      Triage Note    Kitty Abrams Patient Status:  Outpatient    5/10/1990 MRN [id number]   Location [facility] Attending Kristina Molina CNM   Hosp Day # 0 PCP Luis A Kenny MD      Para:   Estimated Date of Delivery: 25  Gestation: 34w3d    Chief Complaint    Decreased Fetal Movement         Allergies:  Allergies[1]    No orders of the defined types were placed in this encounter.      Lab Results   Component Value Date    WBC 12.3 (H) 2024    HGB 11.0 (L) 2024    HCT 32.6 (L) 2024    .0 2024    CREATSERUM 0.60 2024    BUN 11 2024     2024    K 4.0 2024     2024    CO2 25.0 2024    GLU 75 2024    CA 9.7 2024    ALB 4.2 2024    ALKPHO 48 2024    BILT 0.4 2024    TP 7.1 2024    AST 15 2024    ALT 11 2024    T4F 1.0 2024    TSH 2.242 2024       Clinitek UA  Lab Results   Component Value Date    GLUUR Normal 2024    SPECGRAVITY 1.010 2025    URINECUL No Growth 2 Days 2024       UA  Lab Results   Component Value Date    COLORUR Colorless (A) 2024    CLARITY Clear 2024    SPECGRAVITY 1.010 2025    PROUR Negative 2024    GLUUR Normal 2024    KETUR Negative 2024    BILUR Negative 2024    BLOODURINE Negative 2024    NITRITE Negative 2025    UROBILINOGEN Normal 2024    LEUUR 75 (A) 2024       Vitals:    25 1100   BP: 128/73   Pulse: 99       NST  Variability: Moderate           Accelerations: Yes           Decelerations: None            Baseline: 135 BPM           Uterine Irritability: No           Contractions: Not present                                        Acoustic Stimulator: No           Nonstress Test Interpretation: Reactive           Nonstress Test Second Interpretation:  Reactive          FHR Category: Category I             Additional Comments Comments: Pt came in reporting decreased FM. NST reactive. Patient reports +FM. AUDRA Molina CNM reviewed FH strip and assessed patient at bedside. Order for discharge receievd. Pt discharged home in stable condition with written and verbal kick count instructions.     Chief Complaint   Patient presents with    Decreased Fetal Movement     Pt reports decreased in fetal movement. Last felt movement this morning         Shanthi SÁNCHEZ RN  1/17/2025 11:28 AM    Physician Evaluation      NST Interpretation: Reactive    Disposition:   Discharged    Comments:    Fetal movement counts reviewed. Pt has weekly NSTs planned started at 36 weeks.    Kristina Molina CNM         [1]   Allergies  Allergen Reactions    Amoxicillin HIVES    Doxycycline HIVES    Amoxicillin-Na Benzoate RASH

## 2025-01-17 NOTE — PATIENT INSTRUCTIONS
Kick Counts  It’s normal to worry about your baby’s health. One way you can know your baby’s doing well is to record the baby’s movements once a day. This is called a kick count.   Remember to take your kick count records to all your appointments with your healthcare provider.  How to count kicks    Time how long it takes you to feel 10 kicks, flutters, swishes, or rolls. Ideally, you want to feel at least 10 movements in 2 hours. You will likely feel 10 movements in less time than that.  Starting at 28 weeks, count your baby's movements daily. Follow your healthcare provider's instructions for kick counting. Here are tips for counting kicks:  Choose a time when the baby is active, such as after a meal.   Sit comfortably or lie on your side.   The first time the baby moves, write down the time.   Count each movement until the baby has moved  10 times. This can take from 20 minutes to 2 hours.   If you haven't felt 10 kicks by the end of the second hour, wait a few hours. Then try again.  Try to do it at the same time each day.  When to call your healthcare provider  Call your healthcare provider  right away if:  You do a couple sets of kick counts during the day and your baby moves fewer than 10 times in 2 hours.  Your baby moves much less often than on the days before.  You haven't felt your baby move all day.  [employer] last reviewed this educational content on 2020    © 0893-4902 [employer]. All rights reserved. This information is not intended as a substitute for professional medical care. Always follow your healthcare professional's instructions. Premature Labor    Premature labor ( labor) is when symptoms of labor occur before 37 weeks of pregnancy. (This is 3 weeks before your due date.) Premature labor can lead to premature delivery. This means giving birth to your baby early. Babies need at least 37 weeks of pregnancy for all the organs to develop normally. The earlier the  delivery, the greater the risks to the baby.  In most cases, the cause of premature labor is unknown. But certain factors may make the problem more likely. These include:  History of premature labor with other pregnancies  Smoking  Alcohol or substance abuse  Low pre-pregnancy weight or weight gain during pregnancy  Short time period between pregnancies  Being pregnant with twins, triplets, or more  History of certain types of surgery on the cervix or uterus  Having a short cervix  Certain infections  There are a number of other risk factors. Ask your healthcare provider to help you understand the risk factors specific to your case. Then find out what you can do to control or reduce them.  Contractions are one of the main signs of premature labor. A contraction is different from cramping. It may feel painful and the belly (abdomen) may get hard. It can last from a few seconds to a few minutes. Some women may feel only a sense of pressure in the belly, thighs, rectum, or vagina. Some may feel only the hardening of the uterus without pain or pressure. Or there may be a constant pain in the lower back, which spreads forward toward the belly.Premature labor is often treated with medicines. A hospital stay may be needed. If labor doesn't progress and you and your baby are both healthy, you may be discharged to continue care at home.  Home care  Ask your provider any questions you have. Be certain you understand how to care for yourself at home. Also follow all recommendations given by your healthcare providers.  Learn the signs of premature labor. Watch for these signs when you get home.  Limit or restrict activities as advised. This may include stopping certain physical activities and cutting back hours at work.  Avoid doing strenuous work as directed by your provider. Ask family and friends for help with tasks and support at home, if needed.  Don’t smoke, drink alcohol, or use other harmful substances.  Take steps to  reduce stress.  Report any unusual symptoms to your provider.    Follow-up care  Follow up with your healthcare provider, or as directed. Weekly visits with your provider may be needed.  When to seek medical advice  Call your healthcare provider right away if any of these occur:  Regular or frequent contractions, whether they are painful or not  Pressure in the pelvis  Pressure in the lower belly or mild cramping in your belly with or without diarrhea  Constant low, dull backache  Gush or slow leaking of water from your vagina  Change in vaginal discharge (watery, mucus, or bloody)  Any vaginal bleeding  Decreased movement of your baby  [employer] last reviewed this educational content on 2018 [employer]. All rights reserved. This information is not intended as a substitute for professional medical care. Always follow your healthcare professional's instructions.     Understanding Preeclampsia  Preeclampsia is a condition that can happen in pregnancy. It includes high blood pressure (hypertension), swelling, and signs of organ problems. It can show up around week 20 of pregnancy. It often goes away by 12 weeks after you give birth. It can lead to serious health risks for you and your baby. During your pregnancy, your healthcare provider will watch your blood pressure.      Your blood pressure will be monitored regularly throughout your pregnancy to help check for preeclampsia.     Dangers of preeclampsia   If not treated, preeclampsia can cause problems for you and your baby. The placenta is the organ that nourishes your baby. It may tear away from the wall of the uterus. This can put the baby at risk for health problems (fetal distress). It can put the baby at risk for  birth. Preeclampsia can also cause these health problems in you:   Kidney failure or other organ damage  Seizures  Stroke  Who’s at risk for preeclampsia?   No one knows what causes preeclampsia. It can happen  in any pregnant person. But there are things that increase your risk. You may need to take a daily low dose of aspirin if you are at risk for preeclampsia.   You’re at higher risk for preeclampsia if you have any of these:  Diabetes  High blood pressure  Obesity  Kidney disease  Autoimmune disease such as lupus  A family history of preeclampsia  You’re at higher risk if any of these apply to you:  This is your first pregnancy  You are having twins or more  You’re under age 18 or over age 40  You used in vitro fertilization  You are Black  And you’re at higher risk if you had any of these in a past pregnancy:   Preeclampsia  Intrauterine growth retardation (IUGR)   birth  Placental abruption  Fetal death  Symptoms  A common symptom of preeclampsia is high blood pressure. Other symptoms may include:   Fast weight gain  Protein in your urine  Headache  Belly (abdominal) pain on your right side  Vision problems such as flashes or spots  Swelling (edema) in your face or hands (this often happens near the end of a normal pregnancy)  Tests you may have   Your healthcare provider will want to check your blood pressure. This will need to be done often in your pregnancy. If your blood pressure is high, you may have these tests:   Urine tests to look for protein  Blood tests to confirm preeclampsia  Fetal monitoring to make sure that your baby is healthy  Treating preeclampsia   Preeclampsia almost always ends soon after you give birth. Until then, your healthcare provider can help you manage it.   If your symptoms are mild, you may to:     Limit your activity  Rest in bed  Not do heavy lifting    If your symptoms are severe, you will stay in the hospital. Treatment here may include:   Limits to your activity. This is to help control your blood pressure. You should not lift anything heavy. You will need to spend 8 hours a day lying down with your feet up.  Magnesium IV (intravenous) drip. This is done during labor. It's  to prevent seizures  Induced labor or  section. Birth of the baby is considered the cure for preeclampsia.  When to call your healthcare provider   Call your healthcare provider if your symptoms start quickly or are severe. This includes swelling, weight gain, or other symptoms. Some cases of preeclampsia are more severe than others. Your symptoms also may change or get worse as you get closer to your due date.   Once you give birth   In most cases, preeclampsia goes away on its own soon after you give birth. This is often by the 12th week after you deliver. Within days after you give birth, your blood pressure, swelling, and other symptoms should get better. But for some people, problems from preeclampsia can continue after birth.   Postpartum preeclampsia   Preeclampsia that starts after birth is rare. There are 2 types:   Postpartum preeclampsia. This may start in the first 48 hours after birth.  Late-onset preeclampsia. This starts more than 48 hours after birth.  Both of these types are rare. But call your healthcare provider right away if you have symptoms of preeclampsia after you give birth.   Blake last reviewed this educational content on 10/1/2021    © 4041-4725 [employer]. All rights reserved. This information is not intended as a substitute for professional medical care. Always follow your healthcare professional's instructions.

## 2025-01-31 NOTE — PATIENT INSTRUCTIONS
Understanding Preeclampsia  Preeclampsia is high blood pressure (hypertension) that happens during pregnancy. It often shows up around the 20th week of pregnancy. It often goes back to normal by the 12th week after you give birth. It can lead to serious health risks for you and your baby. During your pregnancy, your healthcare provider will watch your blood pressure.    Symptoms  A common symptom of preeclampsia is high blood pressure. Other symptoms may include:  Rapid weight gain  Protein in your urine  Headache  Belly (abdominal) pain on your right side  Vision problems. These include flashes or spots.  Swelling (edema) in your face or hands. This also often happens near the end of normal pregnancies, even without preeclampsia.  Tests you may have  Your healthcare provider will want to check your blood pressure throughout your pregnancy. If your blood pressure is high, you may have the following tests:  Urine tests to look for protein  Blood tests to confirm preeclampsia  Fetal monitoring to make sure that your baby is healthy  Treating preeclampsia  You may need to take a daily low dose of aspirin if you are at risk for preeclampsia. Preeclampsia almost always ends soon after you give birth. Until then, your healthcare provider can help manage your condition. If your symptoms are mild, you may need activity limits at home, including bed rest and no heavy lifting. If your symptoms are severe, you will stay in the hospital. Hospital treatment includes:  Activity limits to help control blood pressure. This means no heavy lifting and 8 hours per day lying down with the feet up.  Magnesium IV (intravenous) drip during labor to prevent seizures  Induced labor or surgical delivery by  section. Delivery is considered the cure for preeclampsia.  When to call your healthcare provider  Call your healthcare provider if swelling, weight gain, or other symptoms come on quickly or are severe. Some cases of  preeclampsia are more severe than others. Your symptoms also may change or get worse as you get closer to your due date.  Who’s at risk?  No one knows what causes preeclampsia. Preeclampsia can happen in any pregnant woman. But it is more common in first-time pregnancies. Things that increase the risk include:  Previous pregnancies. You are at risk if you had preeclampsia, intrauterine growth retardation (IUGR),  birth, placental abruption, or fetal death in a past pregnancy.  Health history of mother. You are at risk if you have diabetes, high blood pressure, obesity, kidney disease, autoimmune disease such as lupus, or a family history of preeclampsia.  Current pregnancy. You are at risk if this is your first pregnancy, or if you have multiple fetuses, are younger than age 18 or older than 40, or used in vitro fertilization.  Race. You are at risk if you are black.  Dangers of preeclampsia  If not treated, preeclampsia can cause problems for you and your baby. The placenta is the organ that nourishes your baby. It may tear away from the uterine wall. This can put the baby at risk for health problems (fetal distress) and premature birth. Preeclampsia can also cause these health problems:  Kidney failure or other organ damage  Seizures  Stroke  Once you give birth  In most cases, preeclampsia goes away on its own soon after you give birth. This is often by the 12th week after you give deliver. Within days of delivery, your blood pressure, swelling, and other symptoms should get better. For some women, problems from preeclampsia can continue after delivery.  Postpartum preeclampsia that develops within the first 48 hours after delivery is rare. Another type of postpartum preeclampsia that develops more than 48 hours after delivery is called late-onset preeclampsia. It is also rare. Contact your healthcare provider right away if you have symptoms of preeclampsia after you deliver.  Blake last reviewed this  educational content on 12/1/2019 © 2000-2020 "[employer]. [address] [place] [id number]. All rights reserved. This information is not intended as a substitute for professional medical care. Always follow your healthcare professional's instructions.        Kick Counts    It’s normal to worry about your baby’s health. One way you can know your baby’s doing well is to record the baby’s movements once a day. This is called a kick count. Remember to take your kick count records to all your appointments with your healthcare provider.  How to count kicks  Time how long it takes you to feel 10 kicks, flutters, swishes, or rolls. Ideally, you want to feel at least 10 movements within 2 hours. You will likely feel 10 movements in less time than that.  Starting at 28 weeks, count your baby's movements daily. Follow your healthcare provider's instructions for kick counting. Here are tips for counting kicks:  Choose a time when the baby is active, such as after a meal.   Sit comfortably or lie on your side.   The first time the baby moves, write down the time.   Count each movement until the baby has moved 10 times. This can take from 20 minutes to 2 hours.   If you have not felt 10 kicks by the end of the second hour, wait a few hours. Then try again.  Try to do it at the same time each day.  When to call your healthcare provider  Call your healthcare provider right away if:  You do a couple sets of kick counts during the day and your baby moves fewer than 10 times in 2 hours  Your baby moves much less often than on the days before.  You have not felt your baby move all day.  [employer] last reviewed this educational content on 12/1/2017 © 2000-2020 "[employer]. [address] [place] [address]. All rights reserved. This information is not intended as a substitute for professional medical care. Always follow your healthcare professional's instructions.        Recognizing  Labor    The beginning of labor is the beginning of birth. You’ll start to feel strong contractions. That’s when the muscles of your uterus tighten up to help push your baby out during birth.  Yes, labor has probably started   Signs of labor include:  Your contractions are getting stronger and more painful instead of weaker. You’ll probably feel them throughout your whole uterus.  Your contractions are regular. This means that you feel them about every 5 to 10 minutes. And they are getting closer together.  You have pink-colored or blood-streaked fluid from your vagina.  You feel that the baby has \"dropped\" lower in your pelvis   Your water breaks. It may be a gush or a slow trickle of clear fluid from your vagina.  No, it’s probably not real labor   Signs of false labor include:  Your contractions aren’t regular or strong.  You feel the contractions only in your lower uterus.  Your contractions go away when you walk or change position.  Your contractions go away after drinking fluids.  When to call your healthcare provider  Call your healthcare provider or clinic right away if you notice any of these signs:  Fluid from your vagina, with or without contractions.  Bleeding heavy enough that you need a sanitary pad.  You don’t feel your baby moving as much as before.     NOTE: Contractions are timed by both of these measures:  The length of each contraction from its start to its finish.  How far apart the contractions are--the time between the start of one contraction and the start of the next contraction.   [employer] last reviewed this educational content on 10/1/2017  © 4881-2311 [employer]. [address] [place] [address], [place] [address]. All rights reserved. This information is not intended as a substitute for professional medical care. Always follow your healthcare professional's instructions.        Stages of Labor    Labor has 3 stages. Your healthcare provider may talk about the progress of your labor  with certain words. One of these is your baby’s position. Another is your baby’s station. And the effacement and dilation of your cervix will be noted. Read below to learn about these terms and the 3 stages of labor.  Your baby moves into position  Position is your baby’s placement in your uterus. Your baby may be facing left or right. He or she may be head first or feet first. Station refers to how far your baby has moved down into your pelvic cavity.  First stage of labor  During the first stage of labor, contractions of the uterus help your cervix thin (efface). They also help it widen (dilate). This will help your baby pass through the birth canal (vagina). At first your contractions will not come that often or last that long. But as time passes, they will come more often, they may be more painful, and they will last longer. They will last about 30 to 60 seconds each. The first stage of labor lasts until the cervix is fully dilated.  Second stage of labor  When your cervix is fully dilated, the second stage of labor begins. In this stage, you will have stronger contractions of your uterus that will help your baby move down the birth canal. They may happen every 2 to 5 minutes. They may last from 45 to 90 seconds each. Your healthcare provider will ask you to push with each contraction. Try to rest between the contractions if you can. Your baby is delivered at the end of this stage of labor.  Third stage of labor  The third stage of labor comes after your baby is born. This is when the afterbirth (placenta) comes out of your uterus. Your uterus will continue to contract. But the contractions are much milder than before.  [employer] last reviewed this educational content on 10/1/2017  © 2711-5808 [employer]. [address] [place] [address], [place] [address]. All rights reserved. This information is not intended as a substitute for professional medical care. Always follow your healthcare professional's  instructions.

## 2025-01-31 NOTE — NST
Nonstress Test   Patient: Kitty Abrams    Gestation: 36w3d    Diagnosis from order:  Obesity in pregnancy    Problem List:   Patient Active Problem List   Diagnosis    Asthma ([facility])    Pregnancy conceived using assisted reproductive technology (ART) ([facility])    Anxiety    Post-traumatic stress disorder, chronic    Obesity in pregnancy ([facility])    Elevated TSH    Allergy to amoxicillin    Decreased fetal movements in third trimester ([facility])    Major depressive disorder, single episode, unspecified       NST:        2025   NST DOCUMENTATION   Variability 6-25 BPM   Accelerations Yes   Decelerations None   Baseline 120 BPM   Uterine Irritability No   Contractions Not present   Acoustic Stimulator No   Nonstress Test Interpretation Reactive   NST Completed by Moses RN   Disposition MW appointment    Testing Plan Weekly NST   Provider Notified Brown CNM         I agree with the above evaluation. NST completed.  Alma Gillette CNM  2025  1:02 PM

## 2025-01-31 NOTE — PROGRESS NOTES
Kitty, , is at 36w3d, here for her MIKIE visit.  Currently, she is feeling well. Denies 3rd trimester danger signs.     Pt offered for MA to be present during exam and patient declined.    Vital signs and weight reviewed  See flowsheets    Assessment/Plan: GBS sent (penicillin allergy)  Next visit: 1 week    Reviewed:   Prenatal visit schedule   labor precautions  Kick counts  Danger signs    Pt verbalized understanding. All questions answered. No barriers to learning identified

## 2025-02-06 NOTE — PROGRESS NOTES
Active fetus Increasing BH contractions. Denies any leaking of fluid or bleeding.  Reviewed S&S labor  Warning signs reviewed  All questions answered. RTC 1 week. Has weekly NSTs scheduled. Declines SVE today but may desires at next visit.       Birth plan reviewed:    Prior birth experiences/outcome: G1, patient's friend  d/t \"complication of epidural\", very fearful of epidural and would like to try all other options first.   ASA stop / anticoagulation switch: stop taking   Support: , mom and sister  Pain management: unmedicated/hydrotherapy but open to nitrous and IV pain meds  Vitamin K: okay  Erythromycin ointment: okay  Placenta: discard  Circumcision: n/a baby girl  Peds: in [place], in hospital peds only   Housing/car seat: stable/has  Contraception: unsure    Cephalic by leopolds Elisa Napolitano, SNM under direct supervision and in collaboration with Coral Tamez CNM     I personally performed the patient's exam and medical decision making on this date of service.  I was physically present in the room for the performance of the E/M service.  I have reviewed the FRANCY student's documentation and findings including history, Exam, and Medical Decision Making, edited the document for accuracy and verify that it represents the clinical findings and services performed.

## 2025-02-14 NOTE — PROGRESS NOTES
Kitty, , is at 38w3d, here for her MIKIE visit.  Currently, she is feeling well. Denies 3rd trimester danger signs. Desires SVE today. Has lost her mucous plug and is uncomfortable. Ready to have the baby. No contractions.    Vital signs and weight reviewed  See flowsheets    Assessment/Plan: Care is up to date  Next visit: 1 week    Reviewed:   Prenatal visit schedule  Kick counts  Danger signs  Labor precautions    Pt verbalized understanding. All questions answered. No barriers to learning identified

## 2025-02-14 NOTE — PATIENT INSTRUCTIONS
Understanding Preeclampsia  Preeclampsia is high blood pressure (hypertension) that happens during pregnancy. It often shows up around the 20th week of pregnancy. It often goes back to normal by the 12th week after you give birth. It can lead to serious health risks for you and your baby. During your pregnancy, your healthcare provider will watch your blood pressure.    Symptoms  A common symptom of preeclampsia is high blood pressure. Other symptoms may include:  Rapid weight gain  Protein in your urine  Headache  Belly (abdominal) pain on your right side  Vision problems. These include flashes or spots.  Swelling (edema) in your face or hands. This also often happens near the end of normal pregnancies, even without preeclampsia.  Tests you may have  Your healthcare provider will want to check your blood pressure throughout your pregnancy. If your blood pressure is high, you may have the following tests:  Urine tests to look for protein  Blood tests to confirm preeclampsia  Fetal monitoring to make sure that your baby is healthy  Treating preeclampsia  You may need to take a daily low dose of aspirin if you are at risk for preeclampsia. Preeclampsia almost always ends soon after you give birth. Until then, your healthcare provider can help manage your condition. If your symptoms are mild, you may need activity limits at home, including bed rest and no heavy lifting. If your symptoms are severe, you will stay in the hospital. Hospital treatment includes:  Activity limits to help control blood pressure. This means no heavy lifting and 8 hours per day lying down with the feet up.  Magnesium IV (intravenous) drip during labor to prevent seizures  Induced labor or surgical delivery by  section. Delivery is considered the cure for preeclampsia.  When to call your healthcare provider  Call your healthcare provider if swelling, weight gain, or other symptoms come on quickly or are severe. Some cases of  preeclampsia are more severe than others. Your symptoms also may change or get worse as you get closer to your due date.  Who’s at risk?  No one knows what causes preeclampsia. Preeclampsia can happen in any pregnant woman. But it is more common in first-time pregnancies. Things that increase the risk include:  Previous pregnancies. You are at risk if you had preeclampsia, intrauterine growth retardation (IUGR),  birth, placental abruption, or fetal death in a past pregnancy.  Health history of mother. You are at risk if you have diabetes, high blood pressure, obesity, kidney disease, autoimmune disease such as lupus, or a family history of preeclampsia.  Current pregnancy. You are at risk if this is your first pregnancy, or if you have multiple fetuses, are younger than age 18 or older than 40, or used in vitro fertilization.  Race. You are at risk if you are black.  Dangers of preeclampsia  If not treated, preeclampsia can cause problems for you and your baby. The placenta is the organ that nourishes your baby. It may tear away from the uterine wall. This can put the baby at risk for health problems (fetal distress) and premature birth. Preeclampsia can also cause these health problems:  Kidney failure or other organ damage  Seizures  Stroke  Once you give birth  In most cases, preeclampsia goes away on its own soon after you give birth. This is often by the 12th week after you give deliver. Within days of delivery, your blood pressure, swelling, and other symptoms should get better. For some women, problems from preeclampsia can continue after delivery.  Postpartum preeclampsia that develops within the first 48 hours after delivery is rare. Another type of postpartum preeclampsia that develops more than 48 hours after delivery is called late-onset preeclampsia. It is also rare. Contact your healthcare provider right away if you have symptoms of preeclampsia after you deliver.  [employer] last reviewed this  educational content on 12/1/2019 © 2000-2020 [employer]. [address] [place] [id number]. All rights reserved. This information is not intended as a substitute for professional medical care. Always follow your healthcare professional's instructions.        Kick Counts    It’s normal to worry about your baby’s health. One way you can know your baby’s doing well is to record the baby’s movements once a day. This is called a kick count. Remember to take your kick count records to all your appointments with your healthcare provider.  How to count kicks  Time how long it takes you to feel 10 kicks, flutters, swishes, or rolls. Ideally, you want to feel at least 10 movements within 2 hours. You will likely feel 10 movements in less time than that.  Starting at 28 weeks, count your baby's movements daily. Follow your healthcare provider's instructions for kick counting. Here are tips for counting kicks:  Choose a time when the baby is active, such as after a meal.   Sit comfortably or lie on your side.   The first time the baby moves, write down the time.   Count each movement until the baby has moved 10 times. This can take from 20 minutes to 2 hours.   If you have not felt 10 kicks by the end of the second hour, wait a few hours. Then try again.  Try to do it at the same time each day.  When to call your healthcare provider  Call your healthcare provider right away if:  You do a couple sets of kick counts during the day and your baby moves fewer than 10 times in 2 hours  Your baby moves much less often than on the days before.  You have not felt your baby move all day.  [employer] last reviewed this educational content on 12/1/2017 © 2000-2020 [employer]. [address] [place] [address]. All rights reserved. This information is not intended as a substitute for professional medical care. Always follow your healthcare professional's instructions.        Recognizing  Labor    The beginning of labor is the beginning of birth. You’ll start to feel strong contractions. That’s when the muscles of your uterus tighten up to help push your baby out during birth.  Yes, labor has probably started   Signs of labor include:  Your contractions are getting stronger and more painful instead of weaker. You’ll probably feel them throughout your whole uterus.  Your contractions are regular. This means that you feel them about every 5 to 10 minutes. And they are getting closer together.  You have pink-colored or blood-streaked fluid from your vagina.  You feel that the baby has \"dropped\" lower in your pelvis   Your water breaks. It may be a gush or a slow trickle of clear fluid from your vagina.  No, it’s probably not real labor   Signs of false labor include:  Your contractions aren’t regular or strong.  You feel the contractions only in your lower uterus.  Your contractions go away when you walk or change position.  Your contractions go away after drinking fluids.  When to call your healthcare provider  Call your healthcare provider or clinic right away if you notice any of these signs:  Fluid from your vagina, with or without contractions.  Bleeding heavy enough that you need a sanitary pad.  You don’t feel your baby moving as much as before.     NOTE: Contractions are timed by both of these measures:  The length of each contraction from its start to its finish.  How far apart the contractions are--the time between the start of one contraction and the start of the next contraction.   [employer] last reviewed this educational content on 10/1/2017  © 6260-3922 [employer]. [address] [place] [address], [place] [address]. All rights reserved. This information is not intended as a substitute for professional medical care. Always follow your healthcare professional's instructions.        Stages of Labor    Labor has 3 stages. Your healthcare provider may talk about the progress of your labor  with certain words. One of these is your baby’s position. Another is your baby’s station. And the effacement and dilation of your cervix will be noted. Read below to learn about these terms and the 3 stages of labor.  Your baby moves into position  Position is your baby’s placement in your uterus. Your baby may be facing left or right. He or she may be head first or feet first. Station refers to how far your baby has moved down into your pelvic cavity.  First stage of labor  During the first stage of labor, contractions of the uterus help your cervix thin (efface). They also help it widen (dilate). This will help your baby pass through the birth canal (vagina). At first your contractions will not come that often or last that long. But as time passes, they will come more often, they may be more painful, and they will last longer. They will last about 30 to 60 seconds each. The first stage of labor lasts until the cervix is fully dilated.  Second stage of labor  When your cervix is fully dilated, the second stage of labor begins. In this stage, you will have stronger contractions of your uterus that will help your baby move down the birth canal. They may happen every 2 to 5 minutes. They may last from 45 to 90 seconds each. Your healthcare provider will ask you to push with each contraction. Try to rest between the contractions if you can. Your baby is delivered at the end of this stage of labor.  Third stage of labor  The third stage of labor comes after your baby is born. This is when the afterbirth (placenta) comes out of your uterus. Your uterus will continue to contract. But the contractions are much milder than before.  [employer] last reviewed this educational content on 10/1/2017  © 7493-9506 [employer]. [address] [place] [address], [place] [address]. All rights reserved. This information is not intended as a substitute for professional medical care. Always follow your healthcare professional's  instructions.

## 2025-02-18 NOTE — PROGRESS NOTES
No LMP recorded. Patient is pregnant. 2025, by [facility] 39w0d here today for MIKIE visit. Baby active. Denies  LOF or bleeding.     GBS- Negative    Had some nausea and vomiting overnight. Was cramping overnight but now resolved.       Has NST scheduled  Cervix fingertip.     SOL and warning signs reviewed.

## 2025-02-18 NOTE — TELEPHONE ENCOUNTER
Received FMLA forms from patient in office, DARREN was signed and NO payment was collected. Sent to the forms department for completion.

## 2025-02-18 NOTE — PATIENT INSTRUCTIONS
Kick Counts  It’s normal to worry about your baby’s health. One way you can know your baby’s doing well is to record the baby’s movements once a day. This is called a kick count.   Remember to take your kick count records to all your appointments with your healthcare provider.  How to count kicks    Time how long it takes you to feel 10 kicks, flutters, swishes, or rolls. Ideally, you want to feel at least 10 movements in 2 hours. You will likely feel 10 movements in less time than that.  Starting at 28 weeks, count your baby's movements daily. Follow your healthcare provider's instructions for kick counting. Here are tips for counting kicks:  Choose a time when the baby is active, such as after a meal.   Sit comfortably or lie on your side.   The first time the baby moves, write down the time.   Count each movement until the baby has moved  10 times. This can take from 20 minutes to 2 hours.   If you haven't felt 10 kicks by the end of the second hour, wait a few hours. Then try again.  Try to do it at the same time each day.  When to call your healthcare provider  Call your healthcare provider  right away if:  You do a couple sets of kick counts during the day and your baby moves fewer than 10 times in 2 hours.  Your baby moves much less often than on the days before.  You haven't felt your baby move all day.  [employer] last reviewed this educational content on 8/1/2020    © 2227-0136 [employer]. All rights reserved. This information is not intended as a substitute for professional medical care. Always follow your healthcare professional's instructions. Understanding Preeclampsia  Preeclampsia is a condition that can happen in pregnancy. It includes high blood pressure (hypertension), swelling, and signs of organ problems. It can show up around week 20 of pregnancy. It often goes away by 12 weeks after you give birth. It can lead to serious health risks for you and your baby. During your  pregnancy, your healthcare provider will watch your blood pressure.      Your blood pressure will be monitored regularly throughout your pregnancy to help check for preeclampsia.     Dangers of preeclampsia   If not treated, preeclampsia can cause problems for you and your baby. The placenta is the organ that nourishes your baby. It may tear away from the wall of the uterus. This can put the baby at risk for health problems (fetal distress). It can put the baby at risk for  birth. Preeclampsia can also cause these health problems in you:   Kidney failure or other organ damage  Seizures  Stroke  Who’s at risk for preeclampsia?   No one knows what causes preeclampsia. It can happen in any pregnant person. But there are things that increase your risk. You may need to take a daily low dose of aspirin if you are at risk for preeclampsia.   You’re at higher risk for preeclampsia if you have any of these:  Diabetes  High blood pressure  Obesity  Kidney disease  Autoimmune disease such as lupus  A family history of preeclampsia  You’re at higher risk if any of these apply to you:  This is your first pregnancy  You are having twins or more  You’re under age 18 or over age 40  You used in vitro fertilization  You are Black  And you’re at higher risk if you had any of these in a past pregnancy:   Preeclampsia  Intrauterine growth retardation (IUGR)   birth  Placental abruption  Fetal death  Symptoms  A common symptom of preeclampsia is high blood pressure. Other symptoms may include:   Fast weight gain  Protein in your urine  Headache  Belly (abdominal) pain on your right side  Vision problems such as flashes or spots  Swelling (edema) in your face or hands (this often happens near the end of a normal pregnancy)  Tests you may have   Your healthcare provider will want to check your blood pressure. This will need to be done often in your pregnancy. If your blood pressure is high, you may have these tests:   Urine  tests to look for protein  Blood tests to confirm preeclampsia  Fetal monitoring to make sure that your baby is healthy  Treating preeclampsia   Preeclampsia almost always ends soon after you give birth. Until then, your healthcare provider can help you manage it.   If your symptoms are mild, you may to:     Limit your activity  Rest in bed  Not do heavy lifting    If your symptoms are severe, you will stay in the hospital. Treatment here may include:   Limits to your activity. This is to help control your blood pressure. You should not lift anything heavy. You will need to spend 8 hours a day lying down with your feet up.  Magnesium IV (intravenous) drip. This is done during labor. It's to prevent seizures  Induced labor or  section. Birth of the baby is considered the cure for preeclampsia.  When to call your healthcare provider   Call your healthcare provider if your symptoms start quickly or are severe. This includes swelling, weight gain, or other symptoms. Some cases of preeclampsia are more severe than others. Your symptoms also may change or get worse as you get closer to your due date.   Once you give birth   In most cases, preeclampsia goes away on its own soon after you give birth. This is often by the 12th week after you deliver. Within days after you give birth, your blood pressure, swelling, and other symptoms should get better. But for some people, problems from preeclampsia can continue after birth.   Postpartum preeclampsia   Preeclampsia that starts after birth is rare. There are 2 types:   Postpartum preeclampsia. This may start in the first 48 hours after birth.  Late-onset preeclampsia. This starts more than 48 hours after birth.  Both of these types are rare. But call your healthcare provider right away if you have symptoms of preeclampsia after you give birth.   [employer] last reviewed this educational content on 10/1/2021    © 6362-6895 [employer]. All rights reserved.  This information is not intended as a substitute for professional medical care. Always follow your healthcare professional's instructions.

## 2025-02-21 NOTE — TELEPHONE ENCOUNTER
Received  Family Medical Leave Act forms via e-mail in the forms department with valid authorization. Logged for processing.

## 2025-02-25 NOTE — TELEPHONE ENCOUNTER
Kitty paged to report infrequent contractions since the middle of the night, about 2/hour. However, she also has a headache, body aches, and a new cough. States baby hasn't moved since she awoke this morning. Advised pt to come to Triage for evaluation of BP and fetal movement and resp panel. She voiced understanding and agreed with plan. All questions answered.

## 2025-02-25 NOTE — H&P
[facility], [place]  part of [facility]    History & Physical    Kitty Abrams Patient Status:  Outpatient    5/10/1990 MRN [id number]   Location [facility] Attending Caryl Ricketts CNM   Hosp Day # 0 Admitting Lisette Camacho,      Date of Admission:  2025      HPI:   Kitty Abrams is 34 year old and  with current gestational age of 40w0d and estimated due date of 2025, by Embryo Transfer.    Kitty paged this morning to report some infrequent contractions overnight but also had headache, body aches, felt hot (temp 97 at home), and new onset cough. She stated that the baby had not moved since she awoke. Pt evaluated in Triage due to decreased fetal movement and headache. Had one mildly elevated BP in Triage. Headache resolved with Tylenol in Triage. She tested positive for influenza A today. Given the option for an elective induction vs awaiting spontaneous labor due to elevated BP and headache without official gestational hypertension criteria, she decided to stay and be induced.   We discussed her possible desire for but also extreme fear of an epidural and other options for pain management. She thinks she may want an epidural eventually but will try all other options first.    Kitty is being admitted for induction of labor (IOL).   Her current obstetrical history is significant for  IVF, obesity, asthma, anxiety & depression w/ PTSD, and now influenza A, tachycardia, and elevated BP x1.     Patient reports no bleeding, no leaking, and infrequent contractions  .     Fetal Movement:  was decreased but normal since arrival in Triage .     History   Obstetric History:   OB History    Para Term  AB Living   1 0 0 0 0     SAB IAB Ectopic Multiple Live Births   0 0 0          # Outcome Date GA Lbr Hector/2nd Weight Sex Type Anes PTL Lv   1 Current              Past Medical History:   Past Medical History:    ALLERGIC RHINITIS     ASTHMA    has been to ER infrequently for neb treatment (has nebulizer at home)    Depression    PTSD (post-traumatic stress disorder)     Past Social History: No past surgical history on file.  Family History:   Family History   Problem Relation Age of Onset    Diabetes Father     Heart Attack Maternal Grandfather     Prostate Cancer Paternal Grandfather     Diabetes Maternal Grandfather     Diabetes Paternal Grandmother     Cancer Paternal Grandmother         renal carcinoma    Cancer Paternal Grandfather         prostate cancer    Other Sister         allergies    Diabetes Other      Social History:   Social History     Tobacco Use    Smoking status: Never    Smokeless tobacco: Never   Substance Use Topics    Alcohol use: No        Allergies/Medications:   Allergies:   Allergies[1]  Medications:  Prescriptions Prior to Admission[2]    Review of Systems:   Constitutional: denies fever, aches, chills  HEENT: denies visual changes  Skin: denies any unusual skin lesions or itching  Lungs: denies shortness of breath but cough present  Cardiovascular: denies chest pain  GI: denies abdominal pain,denies heartburn, denies constipation or diarrhea  : denies dysuria, pelvic pain, vaginal discharge or bleeding  Musculoskeletal: denies back or joint pain  Neuro denies dizziness, headache resolved  Psych: denies depression or anxiety    Physical Exam:   Temp:  [98.7 °F (37.1 °C)] 98.7 °F (37.1 °C)  Pulse:  [117-130] 129  BP: (124-141)/(80-87) 124/81  SpO2:  [97 %-100 %] 98 %    Constitutional: alert, appears stated age, and cooperative  Skin: no lesions or erythema  Respiratory: unlabored  Cardiac: regular rate  Abdomen: soft, non-tender, EFW 3600g, presentation cephalic, uterine contractions qoccasional  Fetal Surveillance:  130 BPM; Fetal heart variability: moderate  Fetal Heart Rate decelerations: none  Fetal Heart Rate accelerations: yes  Pelvis: Gynecoid  External Genitalia:  No lesions  Sterile vaginal exam:  1/90/-1,  anterior, cephalic  Extremities: edema +1 bilateral pedal  Musculoskeletal: steady gait  Psych:  Appropriate affect and speech    Results:     Prenatal Results       Initial       Test Value Reference Range Date Time    Blood Type (ABO Group)  O   08/12/24 1211    Rh Factor  Positive   08/12/24 1211    Antibody Screen (Required questions in OE to answer)  Negative   08/12/24 1211    HCT  35.5 % 35.0 - 48.0 08/12/24 1211    HGB  11.5 g/dL 12.0 - 16.0 08/12/24 1211    MCV  84.7 fL 80.0 - 100.0 08/12/24 1211    Platelets  351.0 10(3)uL 150.0 - 450.0 08/12/24 1211    Rubella  Positive  Positive 08/12/24 1211    TREP  Nonreactive  Nonreactive  08/12/24 1211    RPR ([facility])        Urine Culture  No Growth 2 Days   08/12/24 1211    Hepatitis B  Nonreactive  Nonreactive  08/12/24 1211    HIV Combo  Non-Reactive  Non-Reactive 08/12/24 1211    HCV  Nonreactive  Nonreactive  08/12/24 1211              Optional Initial Labs       Test Value Reference Range Date Time    TSH  2.242 mIU/mL 0.550 - 4.780 09/11/24 1138    Pap Smear        HPV        GC DNA        Chlamydia DNA        GTT 1 Hr        Glucose Fasting        Glucose 1 Hr        Glucose 2 Hr        Glucose 3 Hr        HgB A1c        Vitamin D                  8-20 Weeks       Test Value Reference Range Date Time    1st Trimester Aneuploidy Risk Assessment        Quad - Down Screen Risk Estimate - prior to 02/20/18        Quad - Down Maternal Age Risk - prior to 02/20/18        Quad - Trisomy 18 screen Risk Estimate - prior to 02/20/18        AFP Spina Bifida (Required questions in OE to answer )        QUAD/AFP - Interpretation        Free Fetal DNA  ^ low risk   08/03/24     Genetic testing        Genetic testing        Genetic testing        GC DNA        Chlamydia DNA                  24-28 Weeks       Test Value Reference Range Date Time    HCT  32.6 % 35.0 - 48.0 12/05/24 1310    HGB  11.0 g/dL 12.0 - 16.0 12/05/24 1310    Platelets  289.0 10(3)uL 150.0 - 450.0  24 1310    GTT 1 Hr  117 mg/dL See comment 24 1310    Glucose Fasting        Glucose 1 Hr        Glucose 2 Hr        Glucose 3 Hr        TSH         Profile        Urine Culture        GC DNA        Chlamydia DNA                  35-37 Weeks       Test Value Reference Range Date Time    HCT        HGB        Platelets        TREP  Nonreactive  Nonreactive  24 1310    RPR ([facility])        Genital Group B Culture  Negative  Negative 25 1337    TSH  2.154 uIU/mL 0.550 - 4.780 25 1145    Urine Culture  No Growth at 18-24 hrs.   25 1214    HIV Combo  Non-Reactive  Non-Reactive 24 1310    GC DNA        Chlamydia DNA        Rapid HIV                  Optional Labs       Test Value Reference Range Date Time    HgB A1c        HGB Electrophoresis  (See Report)    24 1211    Varicella Zoster        Cystic Fibrosis-Old         Cystic Fibrosis[32] (Required questions in OE to answer)        Cystic Fibrosis[165] (Required questions in OE to answer)        Cystic Fibrosis[165] (Required questions in OE to answer)        Cystic Fibrosis[165] (Required questions in OE to answer)        Sickle Cell        24Hr Urine Protein        24Hr Urine Creatinine        Parvo B19 IgM        Parvo B19 IgG                  Legend    ^: Historical                            Reviewed all prenatal ultrasounds    Admit labs pending    Assessment/Plan:   Kitty is 34 year old and  with current gestational age of 40w0d and estimated due date of 2025, by Embryo Transfer.    Not in labor.  Category 1 FHR tracing  Obstetrical history significant for  IVF, obesity, asthma, anxiety & depression w/ PTSD, and now influenza A, tachycardia, and elevated BP x1 .    Admission problem(s):      40 weeks gestation of pregnancy (HCC)/Encounter for elective induction of labor (HCC)  Plan for Cook's placement and pitocin PRN Cook's does not cause frequent contractions      Elevated BP without  diagnosis of hypertension  Once in Triage - mildly elevated  Headache resolved with treatment  Will send pre-eclamptic labs if repeated elevated BP      Tachycardia  Likely related to influenza A      Influenza A  CMP ordered for creatinine level and will order Tamiflu once resulted  Isolation x7 days - aware only primary visitor who must stay with her and no other visitors  Will stay masked      Asthma ([facility])  Last used inhaler last night due to cough  Inhaler ordered to bedside      Pregnancy conceived using assisted reproductive technology (ART) ([facility])  Due to male infertility  Normal AP testing      Anxiety & Depression  Maintained on Sertraline 75mg daily - ordered      Post-traumatic stress disorder, chronic  Related to close friend dying in labor \"from epidural\"   Pain relief options have been reviewed  Risks/benefits of epidural reviewed - patient considering epidural      Obesity in pregnancy (HCC)  Pre-pregnancy BMI 32.62  Total weight gain in pregnancy 37 lbs  Normal AP testing      Risks, benefits, alternatives and possible complications have been discussed in detail with the patient.   Pre-admission, admission, and post admission procedures and expectations were discussed in detail.    All questions answered, all appropriate consents will be signed at the Hospital. Admission is planned for today.   Anticipate vaginal delivery.  Dr Camacho notified of patient status and plan upon admission.    Caryl Ricketts CNM  2/25/2025  1:02 PM  Caring for patient until 1900         [1]   Allergies  Allergen Reactions    Amoxicillin HIVES    Doxycycline HIVES    Amoxicillin-Na Benzoate RASH   [2]   Medications Prior to Admission   Medication Sig Dispense Refill Last Dose/Taking    albuterol 108 (90 Base) MCG/ACT Inhalation Aero Soln Inhale 1 puff into the lungs every 6 (six) hours as needed for Wheezing. Patient reported   2/25/2025 at  3:00 AM    ondansetron (ZOFRAN) 4 mg tablet Take 1 tablet (4 mg total) by mouth every 8  (eight) hours as needed for Nausea. 30 tablet 0 2/24/2025 at  7:00 PM    prenatal vitamin with DHA 27-0.8-228 MG Oral Cap Take 1 capsule by mouth daily.   2/24/2025 at  7:30 PM    Pyridoxine HCl (VITAMIN B-6) 25 MG Oral Tab Take 1 tablet (25 mg total) by mouth daily.   2/24/2025 at  7:30 PM    ASPIRIN LOW DOSE 81 MG Oral Tab EC    2/24/2025 at  7:30 PM    sertraline 50 MG Oral Tab Take 1.5 tablets (75 mg total) by mouth daily.   2/24/2025 at  7:30 PM    Montelukast Sodium (SINGULAIR) 10 MG Oral Tab Take 1 Tab by mouth nightly. 30 Tab 4 2/24/2025 at  7:30 AM    WAL-FEX ALLERGY 180 MG Oral Tab TAKE 1 TABLET BY MOUTH DAILY 30 Tab 1 2/25/2025 Morning    Fexofenadine HCl (ALLEGRA) 180 MG Oral Tab Take 1 Tab by mouth daily. 30 Tab 2 2/25/2025 Morning

## 2025-02-26 NOTE — ANESTHESIA PREPROCEDURE EVALUATION
Anesthesia PreOp Note    HPI:     Kitty Abrams is a 34 year old female who presents for preoperative consultation requested by: * No surgeons listed *    Date of Surgery: 2/26/2025    * No procedures listed *  Indication: * No pre-op diagnosis entered *    Relevant Problems   No relevant active problems       NPO:                         History Review:  Patient Active Problem List    Diagnosis Date Noted    40 weeks gestation of pregnancy ([facility]) 02/25/2025    Encounter for elective induction of labor ([facility]) 02/25/2025    Influenza A 02/25/2025    Tachycardia 02/25/2025    Elevated BP without diagnosis of hypertension 02/25/2025    Encounter for induction of labor ([facility]) 02/25/2025    Pregnant ([facility]) 02/06/2025    Major depressive disorder, single episode, unspecified 01/31/2025    Allergy to amoxicillin 08/12/2024    Anxiety 07/18/2024    Post-traumatic stress disorder, chronic 07/18/2024    Obesity in pregnancy ([facility]) 07/18/2024    Elevated TSH 07/18/2024    Pregnancy conceived using assisted reproductive technology (ART) ([facility]) 06/27/2024    Asthma ([facility]) 05/11/2010       Past Medical History:    ALLERGIC RHINITIS    ASTHMA    has been to ER infrequently for neb treatment (has nebulizer at home)    Depression    PTSD (post-traumatic stress disorder)       History reviewed. No pertinent surgical history.    Prescriptions Prior to Admission[1]  Current Medications and Prescriptions Ordered in Epic[2]    Allergies[3]    Family History   Problem Relation Age of Onset    Diabetes Father     Heart Attack Maternal Grandfather     Prostate Cancer Paternal Grandfather     Diabetes Maternal Grandfather     Diabetes Paternal Grandmother     Cancer Paternal Grandmother         renal carcinoma    Cancer Paternal Grandfather         prostate cancer    Other Sister         allergies    Diabetes Other      Social History     Socioeconomic History    Marital status:    Tobacco Use    Smoking status: Never    Smokeless  tobacco: Never   Substance and Sexual Activity    Alcohol use: No    Drug use: No       Available pre-op labs reviewed.  Lab Results   Component Value Date    WBC 8.8 02/25/2025    RBC 3.97 02/25/2025    HGB 10.9 (L) 02/25/2025    HCT 32.6 (L) 02/25/2025    MCV 82.1 02/25/2025    MCH 27.5 02/25/2025    MCHC 33.4 02/25/2025    RDW 13.8 02/25/2025    .0 02/25/2025     Lab Results   Component Value Date     (L) 02/25/2025    K 3.8 02/25/2025     02/25/2025    CO2 17.0 (L) 02/25/2025    BUN <5 (L) 02/25/2025    CREATSERUM 0.73 02/25/2025    GLU 90 02/25/2025    CA 8.7 02/25/2025          Vital Signs:  Body mass index is 38.58 kg/m².   height is 1.676 m (5' 6\") and weight is 108.4 kg (239 lb). Her axillary temperature is 101.2 °F (38.4 °C) (abnormal). Her blood pressure is 116/54 and her pulse is 125 (abnormal). Her respiration is 18 and oxygen saturation is 97%.   Vitals:    02/26/25 1116 02/26/25 1119 02/26/25 1121 02/26/25 1126   BP: 134/76 122/50 116/52 116/54   Pulse: (!) 137 (!) 135 (!) 136 (!) 125   Resp:       Temp:       TempSrc:       SpO2:   97% 97%   Weight:       Height:            Anesthesia Evaluation     Patient summary reviewed and Nursing notes reviewed    No history of anesthetic complications   Airway   Mallampati: II  Neck ROM: full  Dental      Pulmonary - negative ROS and normal exam   Cardiovascular - negative ROS and normal exam    Neuro/Psych - negative ROS     GI/Hepatic/Renal - negative ROS     Endo/Other - negative ROS   Abdominal  - normal exam  (+) obese                 Anesthesia Plan:   ASA:  3  Plan:   Epidural  Informed Consent Plan and Risks Discussed With:  Patient      I have informed Kitty Abrams and/or legal guardian or family member of the nature of the anesthetic plan, benefits, risks including possible dental damage if relevant, major complications, and any alternative forms of anesthetic management.   All of the patient's questions were answered to  the best of my ability. The patient desires the anesthetic management as planned.  Holland Levine MD  2/26/2025 11:36 AM  Present on Admission:   Anxiety   Asthma ([facility])   Major depressive disorder, single episode, unspecified   Obesity in pregnancy ([facility])   Post-traumatic stress disorder, chronic   Pregnancy conceived using assisted reproductive technology (ART) ([facility])   40 weeks gestation of pregnancy ([facility])   Encounter for elective induction of labor ([facility])   Influenza A   Tachycardia   Elevated BP without diagnosis of hypertension           [1]   Medications Prior to Admission   Medication Sig Dispense Refill Last Dose/Taking    albuterol 108 (90 Base) MCG/ACT Inhalation Aero Soln Inhale 1 puff into the lungs every 6 (six) hours as needed for Wheezing. Patient reported   2/25/2025 at  3:00 AM    ondansetron (ZOFRAN) 4 mg tablet Take 1 tablet (4 mg total) by mouth every 8 (eight) hours as needed for Nausea. 30 tablet 0 2/24/2025 at  7:00 PM    prenatal vitamin with DHA 27-0.8-228 MG Oral Cap Take 1 capsule by mouth daily.   2/24/2025 at  7:30 PM    Pyridoxine HCl (VITAMIN B-6) 25 MG Oral Tab Take 1 tablet (25 mg total) by mouth daily.   2/24/2025 at  7:30 PM    ASPIRIN LOW DOSE 81 MG Oral Tab EC    2/24/2025 at  7:30 PM    sertraline 50 MG Oral Tab Take 1.5 tablets (75 mg total) by mouth daily.   2/24/2025 at  7:30 PM    Montelukast Sodium (SINGULAIR) 10 MG Oral Tab Take 1 Tab by mouth nightly. 30 Tab 4 2/24/2025 at  7:30 AM    WAL-FEX ALLERGY 180 MG Oral Tab TAKE 1 TABLET BY MOUTH DAILY 30 Tab 1 2/25/2025 Morning    Fexofenadine HCl (ALLEGRA) 180 MG Oral Tab Take 1 Tab by mouth daily. 30 Tab 2 2/25/2025 Morning   [2]   Current Facility-Administered Medications Ordered in Epic   Medication Dose Route Frequency Provider Last Rate Last Admin    diphenhydrAMINE (Benadryl) 50 mg/mL  injection 25 mg  25 mg Intravenous Q4H PRN Kristina Molina, CNM   25 mg at 02/26/25 0322    metoclopramide (Reglan) 5 mg/mL injection  10 mg  10 mg Intravenous Q6H PRN Buscemi, Yevgeniydalena, CNM   10 mg at 02/26/25 0404    oxyTOCIN in sodium chloride 0.9% (Pitocin) 30 Units/500mL infusion premix  0.5-20 kay-units/min Intravenous Continuous Buscemi, Maddalena, CNM 2 mL/hr at 02/26/25 0914 2 kay-units/min at 02/26/25 0914    fentaNYL-bupivacaine 2 mcg/mL-0.125% in sodium chloride 0.9% 100 mL EPIDURAL infusion premix   Epidural Continuous Castellanos, Steven, DO        fentaNYL (Sublimaze) 50 mcg/mL injection 100 mcg  100 mcg Epidural Once Castellanos, Steven, DO        bupivacaine PF (Marcaine) 0.25% injection  20 mL Epidural Once Castellanos, Steven, DO        ePHEDrine (PF) 25 MG/5 ML injection 5 mg  5 mg Intravenous PRN Castellanos, Steven, DO        nalbuphine (Nubain) 10 mg/mL injection 2.5 mg  2.5 mg Intravenous Q15 Min PRN Castellanos, Steven, DO        fentaNYL (Sublimaze) 50 mcg/mL injection             bupivacaine PF (Marcaine) 0.25 % injection             ePHEDrine (PF) 25 MG/5 ML injection             fentaNYL-bupivacaine in sodium chloride 0.9% 2 mcg/mL-0.125% EPIDURAL infusion premix             dextrose in lactated ringers 5% infusion   Intravenous Continuous Brooklyn, Caryl,  mL/hr at 02/26/25 0752 Restarted at 02/26/25 0752    lactated ringers infusion   Intravenous PRN Brooklyn, Caryl, CNM        lactated ringers IV bolus 500 mL  500 mL Intravenous PRN Brooklyn, Caryl,  mL/hr at 02/26/25 0942 500 mL at 02/26/25 0942    acetaminophen (Tylenol Extra Strength) tab 500 mg  500 mg Oral Q6H PRN Brooklyn, Caryl, CNM        acetaminophen (Tylenol Extra Strength) tab 1,000 mg  1,000 mg Oral Q6H PRN Brooklyn, Caryl, CNM   1,000 mg at 02/26/25 1005    ibuprofen (Motrin) tab 600 mg  600 mg Oral Once PRN Jamarcus, Caryl, CNM        ondansetron (Zofran) 4 MG/2ML injection 4 mg  4 mg Intravenous Q6H PRN Caryl Ricketts CNM   4 mg at 02/26/25 0322    oxyTOCIN in sodium chloride 0.9% (Pitocin) 30 Units/500mL infusion premix  62.5-900 kay-units/min Intravenous Continuous Caryl Ricketts CNM         terbutaline (Brethine) 1 MG/ML injection 0.25 mg  0.25 mg Subcutaneous PRN Woodbourne, Caryl, CNM        sodium citrate-citric acid (Bicitra) 500-334 MG/5ML oral solution 30 mL  30 mL Oral PRN Jamarcus, Caryl, CNM        lidocaine PF (Xylocaine-MPF) 1% injection  30 mL Intradermal Once Jamarcus, Caryl, CNM        albuterol (Ventolin HFA) 108 (90 Base) MCG/ACT inhaler 2 puff  2 puff Inhalation Q4H PRN WoodbourneGama jacksona, CNM        oseltamivir (Tamiflu) cap 75 mg  75 mg Oral BID Gama Rickettsa, CNM   75 mg at 02/26/25 1005    sertraline (Zoloft) tab 50 mg  50 mg Oral Daily Kristina Molina, CNM   50 mg at 02/25/25 1956    zolpidem (Ambien) tab 5 mg  5 mg Oral Nightly PRN Kristina Molina, CNM   5 mg at 02/25/25 3231     No current Epic-ordered outpatient medications on file.   [3]   Allergies  Allergen Reactions    Amoxicillin HIVES    Doxycycline HIVES    Amoxicillin-Na Benzoate RASH

## 2025-02-26 NOTE — PROGRESS NOTES
[facility]  part of [facility]    Labor Progress Note    Kitty Abrams Patient Status:  Inpatient    5/10/1990 MRN [id number]   Location [facility] Attending Kristina Molina CNM   Hosp Day # 1 PCP Luis A Kenny MD     Subjective:   Interval History:   Kitty is still feeling pretty sick with body aches, nausea, and vomiting from the flu. She reports the tylenol and zofran have been helping. She was able to get about 2 hours of sleep after taking the ambien but is now requesting something else to help with sleep. She isnt really feeling contractions anymore.    Objective:   Vital signs in last 24 hours:  Temp:  [98.2 °F (36.8 °C)-99.1 °F (37.3 °C)] 98.7 °F (37.1 °C)  Pulse:  [113-130] 113  Resp:  [18-20] 18  BP: (124-141)/(76-87) 136/87  SpO2:  [97 %-100 %] 98 %    Input/Output:    Intake/Output Summary (Last 24 hours) at 2025 0309  Last data filed at 2025 1430  Gross per 24 hour   Intake --   Output 160 ml   Net -160 ml       Fetal Surveillance:  Fetal heart tones: baseline 135 BPM, moderate variability, present accelerations, no decelerations  Uterine contractions:  irregular, mild    Sterile vaginal exam:  Deferred; cooks still in place    Results:   Lab Results   Component Value Date    TREPONEMALAB Nonreactive 2025    ABO O 2025    RH Positive 2025    WBC 8.8 2025    HGB 10.9 (L) 2025    HCT 32.6 (L) 2025    .0 2025    CREATSERUM 0.73 2025    BUN <5 (L) 2025     (L) 2025    K 3.8 2025     2025    CO2 17.0 (L) 2025    GLU 90 2025    CA 8.7 2025    ALB 3.9 2025    ALKPHO 100 (H) 2025    BILT 0.4 2025    TP 7.1 2025    AST 44 (H) 2025    ALT 21 2025    T4F 0.9 2025    TSH 2.154 2025       Lab Results   Component Value Date    COLORUR Colorless (A) 2024    CLARITY Clear 2024     SPECGRAVITY 1.010 2025    PROUR Negative 2024    GLUUR Normal 2024    KETUR Negative 2024    BILUR Negative 2024    BLOODURINE Negative 2024    NITRITE Negative 2025    UROBILINOGEN Normal 2024    LEUUR 75 (A) 2024       Assessment & Plan:   Kitty is a 34 y.o.  at 40w1d by embryo transfer admitted for IOL  Category I FHR  VSS  GBS negative        Encounter for elective induction of labor ([facility])  -- Cooks still in place. Discussed starting pitocin now to help expel cooks vs. Waiting until the morning. Pt prefers to continue trying to rest tonight and plan to start pitocin in the morning after cooks is out and breakfast, etc.  -- Benedryl ordered to help pt get some sleep  -- D5LR running d/t pt vomiting      Influenza A  -- Symptoms started yesterday  -- S/p 1 dose of tamiflu      Tachycardia  -- HR remains in the 120s, IVF bolus ordered      Elevated BP without diagnosis of hypertension  -- First BP in triage, no further elevated Bps since or previously in pregnancy      Pregnancy conceived using assisted reproductive technology (ART) ([facility])  -- Due to male infertility  -- Normal AP testing      Obesity in pregnancy ([facility])  -- Pre-pregnancy BMI 32.62  -- Total weight gain in pregnancy 37 lbs      Asthma ([facility])  -- Last used inhaler last night due to cough  -- Inhaler ordered to bedside      Anxiety & Depression  -- Stable on sertraline 75mg      Post-traumatic stress disorder, chronic  -- Best friend  during labor from epidural complications, pt very fearful of epidural but has been working through it in therapy        Plan discussed with patient who verbalizes understanding and agreement.    Kristina Molina CNM  2025

## 2025-02-26 NOTE — CONSULTS
[facility]  Obstetrics and Gynecology  OB/GYN Consult    Kitty Abrams Patient Status:  Inpatient    5/10/1990 MRN [id number]   Location [facility] BIRTH CENTER Attending Kristina Molina CNM   Hospital Day 1 PCP Luis A Kenny MD        SUBJECTIVE:    Kitty Abrams is a 34 year old  female at 40w1d Estimated Date of Delivery: 25 who has been diagnosed with preeclampsia intrapartum. Her current obstetrical history is significant for IVF pregnancyobesity, asthma, anxiety & depression w/ PTSD, and now influenza A diagnosed on , tachycardia.  Presented for induction of labor .    Patient denies severe headache, vision changes, epigastric pain.    EMILY Confirmation  LMP: No LMP recorded. Patient is pregnant.  EMILY: 2025, by Embryo Transfer     Obstetric History:   OB History    Para Term  AB Living   1 0 0 0 0     SAB IAB Ectopic Multiple Live Births   0 0 0          # Outcome Date GA Lbr Hector/2nd Weight Sex Type Anes PTL Lv   1 Current              Past Medical History:   Past Medical History:    ALLERGIC RHINITIS    ASTHMA    has been to ER infrequently for neb treatment (has nebulizer at home)    Depression    PTSD (post-traumatic stress disorder)     Past Social History: History reviewed. No pertinent surgical history.  Family History:   Family History   Problem Relation Age of Onset    Diabetes Father     Heart Attack Maternal Grandfather     Prostate Cancer Paternal Grandfather     Diabetes Maternal Grandfather     Diabetes Paternal Grandmother     Cancer Paternal Grandmother         renal carcinoma    Cancer Paternal Grandfather         prostate cancer    Other Sister         allergies    Diabetes Other      Social History:   Social History     Tobacco Use    Smoking status: Never    Smokeless tobacco: Never   Substance Use Topics    Alcohol use: No       Home Meds: Prescriptions Prior to Admission[1]  Allergies:  Allergies[2]    OBJECTIVE:    Temp:  [98.2 °F (36.8 °C)-101.5 °F (38.6 °C)] 101.5 °F (38.6 °C)  Pulse:  [110-177] 143  Resp:  [18-19] 18  BP: (115-153)/() 134/61  SpO2:  [92 %-100 %] 98 %  Body mass index is 38.58 kg/m².    General: AAO. NAD.   Lungs: Respirations non labored   CV: peripheral pulses +2 bilaterally      FHT: moderate variability/160 BPM / Positive accelerations/Negative decelerations      Prenatal Labs Brief Review      02/26/25 12:36   Urine Protein/Creatinine Ratio, Random 0.52     Blood Type:   Lab Results   Component Value Date    ABO O 02/25/2025    RH Positive 02/25/2025       Inpatient labs:  Lab Results   Component Value Date    CREATSERUM 0.79 02/26/2025    BUN 5 02/26/2025     02/26/2025    K 4.0 02/26/2025     02/26/2025    CO2 20.0 02/26/2025     02/26/2025    CA 8.1 02/26/2025    ALB 3.6 02/26/2025    ALKPHO 92 02/26/2025    BILT 0.3 02/26/2025    TP 6.3 02/26/2025    AST 32 02/26/2025    ALT 19 02/26/2025     Recent Results (from the past 8760 hours)   CBC With Differential With Platelet    Collection Time: 02/25/25  2:33 PM   Result Value Ref Range    WBC 8.8 4.0 - 11.0 x10(3) uL    RBC 3.97 3.80 - 5.30 x10(6)uL    HGB 10.9 (L) 12.0 - 16.0 g/dL    HCT 32.6 (L) 35.0 - 48.0 %    MCV 82.1 80.0 - 100.0 fL    MCH 27.5 26.0 - 34.0 pg    MCHC 33.4 31.0 - 37.0 g/dL    RDW-SD 41.1 35.1 - 46.3 fL    RDW 13.8 11.0 - 15.0 %    .0 150.0 - 450.0 10(3)uL    Neutrophil Absolute Prelim 7.19 1.50 - 7.70 x10 (3) uL    Neutrophil Absolute 7.19 1.50 - 7.70 x10(3) uL    Lymphocyte Absolute 0.79 (L) 1.00 - 4.00 x10(3) uL    Monocyte Absolute 0.77 0.10 - 1.00 x10(3) uL    Eosinophil Absolute 0.01 0.00 - 0.70 x10(3) uL    Basophil Absolute 0.02 0.00 - 0.20 x10(3) uL    Immature Granulocyte Absolute 0.05 0.00 - 1.00 x10(3) uL    Neutrophil % 81.5 %    Lymphocyte % 8.9 %    Monocyte % 8.7 %    Eosinophil % 0.1 %    Basophil % 0.2 %    Immature Granulocyte % 0.6 %     *Note: Due to  a large number of results and/or encounters for the requested time period, some results have not been displayed. A complete set of results can be found in Results Review.        ASSESSMENT/ PLAN:    Kitty Abrams is a 34 year old  female at 40w1d with preeclampsia without severe features.   Patient counseled. Patient currently on pitocin for induction of labor and is progressing in labor. Anticipate . Consider  section delivery for maternal or fetal indications.   Observe for signs/symptoms severe features and recommend transfer to MD care and magnesium sulfate treatment if this develops.     Patient Active Problem List   Diagnosis    Asthma ([facility])    Pregnancy conceived using assisted reproductive technology (ART) ([facility])    Anxiety    Post-traumatic stress disorder, chronic    Obesity in pregnancy ([facility])    Elevated TSH    Allergy to amoxicillin    Major depressive disorder, single episode, unspecified    Pregnant ([facility])    40 weeks gestation of pregnancy ([facility])    Encounter for elective induction of labor ([facility])    Influenza A    Tachycardia    Elevated BP without diagnosis of hypertension    Encounter for induction of labor ([facility])    Mild pre-eclampsia in third trimester ([facility])     30 min spent with patient care, >50% face to face time, chart review, obtaining history, exam and counseling.    MD JOHNNIE Jacobo OBGYN           [1]   Medications Prior to Admission   Medication Sig Dispense Refill Last Dose/Taking    albuterol 108 (90 Base) MCG/ACT Inhalation Aero Soln Inhale 1 puff into the lungs every 6 (six) hours as needed for Wheezing. Patient reported   2025 at  3:00 AM    ondansetron (ZOFRAN) 4 mg tablet Take 1 tablet (4 mg total) by mouth every 8 (eight) hours as needed for Nausea. 30 tablet 0 2025 at  7:00 PM    prenatal vitamin with DHA 27-0.8-228 MG Oral Cap Take 1 capsule by mouth daily.   2025 at  7:30 PM    Pyridoxine HCl (VITAMIN B-6) 25 MG Oral Tab Take  1 tablet (25 mg total) by mouth daily.   2/24/2025 at  7:30 PM    ASPIRIN LOW DOSE 81 MG Oral Tab EC    2/24/2025 at  7:30 PM    sertraline 50 MG Oral Tab Take 1.5 tablets (75 mg total) by mouth daily.   2/24/2025 at  7:30 PM    Montelukast Sodium (SINGULAIR) 10 MG Oral Tab Take 1 Tab by mouth nightly. 30 Tab 4 2/24/2025 at  7:30 AM    WAL-FEX ALLERGY 180 MG Oral Tab TAKE 1 TABLET BY MOUTH DAILY 30 Tab 1 2/25/2025 Morning    Fexofenadine HCl (ALLEGRA) 180 MG Oral Tab Take 1 Tab by mouth daily. 30 Tab 2 2/25/2025 Morning   [2]   Allergies  Allergen Reactions    Amoxicillin HIVES    Doxycycline HIVES    Amoxicillin-Na Benzoate RASH

## 2025-02-26 NOTE — PROGRESS NOTES
[facility]  part of [facility]    Labor Progress Note    Kitty Abrams Patient Status:  Inpatient    5/10/1990 MRN [id number]   Location [facility]TH [facility] Attending Kristina Molina CNM   Hosp Day # 1 PCP Luis A Kenny MD     Subjective:   Interval History:   Not having any pain with epidural infusing.     Objective:   Vital signs in last 24 hours:  Temp:  [98.2 °F (36.8 °C)-101.2 °F (38.4 °C)] 101.2 °F (38.4 °C)  Pulse:  [112-140] 112  Resp:  [18-20] 18  BP: (115-152)/(50-92) 115/56  SpO2:  [94 %-97 %] 94 %    Input/Output:    Intake/Output Summary (Last 24 hours) at 2025 1317  Last data filed at 2025 1430  Gross per 24 hour   Intake --   Output 160 ml   Net -160 ml       Fetal Surveillance:  Fetal heart tones:  150, moderate variability, no accels, intermittent late decels  Uterine contractions:  Not tracing, IUPC inserted and then noted q 2-4 minutes apart    Sterile vaginal exam:  Dilation: 4 cm dilation   Effacement:  90    Station: -1   Position: Cephalic  Presentation: vertex    Thin meconium noted  IUPC & FSE inserted    Results:   Lab Results   Component Value Date    TREPONEMALAB Nonreactive 2025    ABO O 2025    RH Positive 2025    WBC 8.8 2025    HGB 10.9 (L) 2025    HCT 32.6 (L) 2025    .0 2025    CREATSERUM 0.79 2025    BUN 5 (L) 2025     (L) 2025    K 4.0 2025     2025    CO2 20.0 (L) 2025     (H) 2025    CA 8.1 (L) 2025    ALB 3.6 2025    ALKPHO 92 2025    BILT 0.3 2025    TP 6.3 2025    AST 32 2025    ALT 19 2025    T4F 0.9 2025    TSH 2.154 2025       Lab Results   Component Value Date    COLORUR Colorless (A) 2024    CLARITY Clear 2024    SPECGRAVITY 1.010 2025    PROUR Negative 2024    GLUUR Normal 2024    KETUR Negative 2024    BILUR  Negative 2024    BLOODURINE Negative 2024    NITRITE Negative 2025    UROBILINOGEN Normal 2024    LEUUR 75 (A) 2024       Assessment & Plan:      Kitty is a 34 y.o.  at 40w1d by embryo transfer admitted yesterday for IOL  Category 2 FHR   GBS negative          Encounter for elective induction of labor ([facility])  -- S/P Cooks catheter. Now on Pitocin @ 2 mu/min.   --Now with epidural infusing  -SROM @ 0610, now noted thin meconium   --FSE & IUPC inserted. Titrate pitocin to adequate contractions. Continue to monitor fetal status       Influenza A  -- Started on Tamiflu. Receiving Tylenol & Zofran       Tachycardia  -- Due to febrile illness/Influenza, currently 110's       Mild pre-eclampsia  -- B/P's WNL to mildly elevated  --Borderline elevated AST on admit, repeat normal  --P/C ratio 0.52  --Will notify MD of need for consult         Pregnancy conceived using assisted reproductive technology (ART) ([facility])  -- Due to male infertility  -- Normal AP testing       Obesity in pregnancy ([facility])  -- Pre-pregnancy BMI 32.62  -- Total weight gain in pregnancy 37 lbs       Asthma ([facility])  -- Last used inhaler last night due to cough  -- Inhaler ordered to bedside       Anxiety & Depression  -- Stable on sertraline 75mg       Post-traumatic stress disorder, chronic  -- Best friend  during labor from epidural complications, pt very fearful of epidural but has been working through it in therapy        Plan discussed with patient who verbalizes understanding and agreement.    Natividad Pina CNM  2025

## 2025-02-26 NOTE — ANESTHESIA PROCEDURE NOTES
Labor Analgesia    Date/Time: 2/26/2025 11:00 AM    Performed by: Holland Levine MD  Authorized by: Holland Levine MD      General Information and Staff    Start Time:  2/26/2025 11:00 AM  End Time:  2/26/2025 11:10 AM  Anesthesiologist:  Holland Levine MD  Performed by:  Anesthesiologist  Patient Location:  OB  Reason for Block: labor epidural    Preanesthetic Checklist: patient identified, IV checked, site marked, risks and benefits discussed, monitors and equipment checked, pre-op evaluation, timeout performed, anesthesia consent and sterile technique used      Procedure Details    Patient Position:  Sitting  Prep: ChloraPrep    Monitoring:  Heart rate  Approach:  Midline    Epidural Needle    Injection Technique:  MILTON air  Needle Type:  Tuohy  Needle Gauge:  18 G  Needle Length:  3.5 in  Location:  L3-4    Spinal Needle      Catheter    Catheter Type:  Multi-orifice  Catheter Size:  20 G  Test Dose:  Negative    Assessment      Additional Comments

## 2025-02-26 NOTE — PROGRESS NOTES
[facility]  part of [facility]    Labor Progress Note    Kitty Abrams Patient Status:  Inpatient    5/10/1990 MRN [id number]   Location [facility]TH C[facility] Attending Kristina Molina CNM   Hosp Day # 1 PCP Luis A Kenny MD     Subjective:   Interval History:   Feeling relief with epidural.   Spiked another fever this morning and received Tylenol.     Objective:   Vital signs in last 24 hours:  Temp:  [98.2 °F (36.8 °C)-101.2 °F (38.4 °C)] 101.2 °F (38.4 °C)  Pulse:  [113-140] 125  Resp:  [18-20] 18  BP: (116-152)/(50-92) 116/54  SpO2:  [95 %-97 %] 97 %    Input/Output:    Intake/Output Summary (Last 24 hours) at 2025 1157  Last data filed at 2025 1430  Gross per 24 hour   Intake --   Output 160 ml   Net -160 ml       Fetal Surveillance:  Fetal heart tones:  150, moderate variability currently with periods of minimal variability, late decel x2.   Earlier when febrile FHT in 170's with minimal variability.  Uterine contractions:  3-6 minutes apart    Sterile vaginal exam:  Deferred  /-1 cephalic per RN @ 0900    Results:   Lab Results   Component Value Date    TREPONEMALAB Nonreactive 2025    ABO O 2025    RH Positive 2025    WBC 8.8 2025    HGB 10.9 (L) 2025    HCT 32.6 (L) 2025    .0 2025    CREATSERUM 0.73 2025    BUN <5 (L) 2025     (L) 2025    K 3.8 2025     2025    CO2 17.0 (L) 2025    GLU 90 2025    CA 8.7 2025    ALB 3.9 2025    ALKPHO 100 (H) 2025    BILT 0.4 2025    TP 7.1 2025    AST 44 (H) 2025    ALT 21 2025    T4F 0.9 2025    TSH 2.154 2025       Lab Results   Component Value Date    COLORUR Colorless (A) 2024    CLARITY Clear 2024    SPECGRAVITY 1.010 2025    PROUR Negative 2024    GLUUR Normal 2024    KETUR Negative 2024    BILUR Negative  2024    BLOODURINE Negative 2024    NITRITE Negative 2025    UROBILINOGEN Normal 2024    LEUUR 75 (A) 2024       Assessment & Plan:      Kitty is a 34 y.o.  at 40w1d by embryo transfer admitted yesterday for IOL  Category 2 FHR for late decel x 1, overall reassuring FWB  GBS negative          Encounter for elective induction of labor ([facility])  -- S/P Cooks catheter. Now on Pitocin @ 2 mu/min. Continue to titrate Pitocin to adequate contractions  --Now with epidural infusing  -SROM of clear fluid @ 0610       Influenza A  -- Started on Tamiflu. Receiving Tylenol & Zofran       Tachycardia  -- Due to febrile illness/Influenza, currently 110's       Elevated BP without diagnosis of hypertension  -- First BP in triage. Also noted mildly elevated b/p this morning with pain and when spiked fever. Currently B/P's WNL  -Borderline elevated AST on admit, normal plt on admit  -Will send p/c ratio and repeat CMP       Pregnancy conceived using assisted reproductive technology (ART) ([facility])  -- Due to male infertility  -- Normal AP testing       Obesity in pregnancy ([facility])  -- Pre-pregnancy BMI 32.62  -- Total weight gain in pregnancy 37 lbs       Asthma ([facility])  -- Last used inhaler last night due to cough  -- Inhaler ordered to bedside       Anxiety & Depression  -- Stable on sertraline 75mg       Post-traumatic stress disorder, chronic  -- Best friend  during labor from epidural complications, pt very fearful of epidural but has been working through it in therapy    Plan discussed with patient who verbalizes understanding and agreement.    Natividad Pina CNM  2025

## 2025-02-26 NOTE — PROGRESS NOTES
[facility]  part of [facility]    Labor Progress Note    Kitty JAYY Aliza Patient Status:  Inpatient    5/10/1990 MRN [id number]   Location [facility] BIRTH CENTER Attending Kristina Molina CNM   Hosp Day # 1 PCP Luis A Kenny MD     Subjective:   Interval History:   Having intermittent pressure with contractions. Feels like fever may be breaking as doesn't feel as cold.     Objective:   Vital signs in last 24 hours:  Temp:  [98.2 °F (36.8 °C)-101.5 °F (38.6 °C)] 101.5 °F (38.6 °C)  Pulse:  [110-177] 143  Resp:  [18-19] 18  BP: (115-153)/() 134/61  SpO2:  [92 %-100 %] 98 %    Input/Output:  No intake or output data in the 24 hours ending 25 1652    Fetal Surveillance:  Fetal heart tones:  180, minimal variablility, no accels, late decels  Uterine contractions:  1.5-4  minutes apart    Sterile vaginal exam:  Dilation: 9 cm dilation   Effacement: 100%   Station: +1   Position: Cephalic  Presentation: vertex    IVF bolus  Pitocin off    Received Ibuprofen as not due yet for Tylenol. D/W Dr Quinonez who agreed with giving a dose of Ibuprofen as baby tachy. Now with temp of 102.5. Will give dose of Tylenol now.     Results:   Lab Results   Component Value Date    TREPONEMALAB Nonreactive 2025    ABO O 2025    RH Positive 2025    WBC 8.8 2025    HGB 10.9 (L) 2025    HCT 32.6 (L) 2025    .0 2025    CREATSERUM 0.79 2025    BUN 5 (L) 2025     (L) 2025    K 4.0 2025     2025    CO2 20.0 (L) 2025     (H) 2025    CA 8.1 (L) 2025    ALB 3.6 2025    ALKPHO 92 2025    BILT 0.3 2025    TP 6.3 2025    AST 32 2025    ALT 19 2025    T4F 0.9 2025    TSH 2.154 2025       Lab Results   Component Value Date    COLORUR Colorless (A) 2024    CLARITY Clear 2024    SPECGRAVITY 1.010 2025    PROUR  Negative 2024    GLUUR Normal 2024    KETUR Negative 2024    BILUR Negative 2024    BLOODURINE Negative 2024    NITRITE Negative 2025    UROBILINOGEN Normal 2024    LEUUR 75 (A) 2024       Assessment & Plan:       Kitty is a 34 y.o.  at 40w1d by embryo transfer admitted yesterday for IOL  Category 2 FHR   GBS negative          Encounter for elective induction of labor ([facility])  -- S/P Cooks catheter.   --Now with epidural infusing  -SROM @ 0610, now noted thin meconium   --Strip now with late decels. Pitocin off, IVF bolus, position changed. D/W Pt and spouse if late decels persist despite interventions we will need to proceed with . They agree with plan. Continue to monitor closely.       Influenza A  -- Started on Tamiflu. Receiving Tylenol & Zofran       Tachycardia  -- Due to febrile illness/Influenza, currently 110's       Mild pre-eclampsia  -- B/P's WNL to mildly elevated  --Borderline elevated AST on admit, repeat normal  --P/C ratio 0.52  --Dr Snow aware and on consult          Pregnancy conceived using assisted reproductive technology (ART) ([facility])  -- Due to male infertility  -- Normal AP testing       Obesity in pregnancy ([facility])  -- Pre-pregnancy BMI 32.62  -- Total weight gain in pregnancy 37 lbs       Asthma ([facility])  -- Last used inhaler last night due to cough  -- Inhaler ordered to bedside       Anxiety & Depression  -- Stable on sertraline 75mg       Post-traumatic stress disorder, chronic  -- Best friend  during labor from epidural complications, pt very fearful of epidural but has been working through it in therapy        Plan discussed with patient who verbalizes understanding and agreement.    Natividad Pina CNM  2025

## 2025-02-27 NOTE — PROGRESS NOTES
Patient up to bathroom with assist x 2.  Voided 400 at this time. Patient transferred to mother/baby room 369 per wheelchair in stable condition with baby and personal belongings.  Accompanied by significant other and staff.  Report given to mother/baby Mackenzie NARAYAN.

## 2025-02-27 NOTE — PROGRESS NOTES
[facility]  part of [facility]    OB/GYNE Progress Note      Kitty Abrams Patient Status:  Inpatient    5/10/1990 MRN [id number]   Location [facility] 3SE Attending Kristina Molina CNM   Hosp Day # 2 PCP Luis A Kenny MD        Assessment/Plan   Kitty Abrams is a 34 year old , day 1 after a vaginal delivery  Feeding: formula feeding and pumping   Discharge home anticipated tomorrow   Postpartum teaching: danger signs and pp expectations       (normal spontaneous vaginal delivery) ([facility])  Stable PP day 1      Postpartum Anemia ([facility])  Admission Hgb 10.9 down to 8.8 today  IV Venofer ordered       Asthma ([facility])  Stable  Inhaler at bedside      Pregnancy conceived using assisted reproductive technology (ART) ([facility])  Delivered      Anxiety  Maintained on Sertraline 75mg daily       Post-traumatic stress disorder, chronic  Related to epidurals  Patient did get an epidural in labor and is pleased with this choice      Obesity in pregnancy ([facility])  Delivered      Major depressive disorder, single episode, unspecified  Maintained on Sertraline 75mg daily   Sees therapist regularly, has postpartum plan for follow up      40 weeks gestation of pregnancy ([facility])  Delivered      Influenza A  Treated with Tamiflu  Symptoms improving, aware of isolation x 7 days  Staying masked and only visitor is her        Tachycardia  Has returned to normal range      Encounter for induction of labor ([facility])  Delivered      Mild pre-eclampsia in third trimester ([facility])  B/P's WNL to mildly elevated, continue to monitor q4hrs  Borderline elevated AST on admit, repeat normal  P/C ratio 0.52  Asymptomatic    Discussed with: nurse     patient     Plan discussed with patient who verbalizes understanding and agreement.        Subjective   Currently feeling better. She is tired but pleased with her labor and delivery  Baby's feeding: Formula feeding and she is pumping with manual  pump  Support at home:   Car seat: has      Review of Systems:  Constitutional: Pain well managed, ambulating without difficulty  Genitourinary: voiding without difficulty   Respiratory: denies shortness of breath or chest pain  Psychiatric: denies        Objective   Vital signs in last 24 hours:  Temp:  [98.1 °F (36.7 °C)-102 °F (38.9 °C)] 98.2 °F (36.8 °C)  Pulse:  [] 77  Resp:  [16] 16  BP: (102-153)/() 137/87  SpO2:  [92 %-100 %] 99 %    Input/Output:    Intake/Output Summary (Last 24 hours) at 2/27/2025 1010  Last data filed at 2/27/2025 0300  Gross per 24 hour   Intake --   Output 1698 ml   Net -1698 ml        Exam:  Constitutional: alert and pleasant, bonding well with baby  Uterus: fundus firm U/1, lochia rubra, small with no clots  Wound/Incision: perineum well approximated with little swelling  Respiratory:  effort normal  Breasts: leaking colostrum, nipples intact  Extremities: without signs of DVT, bilateral non pitting edema on ankles and feet  Psychiatric: mood stable        Results:     Lab Results   Component Value Date    TREPONEMALAB Nonreactive 02/25/2025    ABO O 02/25/2025    RH Positive 02/25/2025    WBC 12.8 (H) 02/27/2025    HGB 8.8 (L) 02/27/2025    HCT 26.6 (L) 02/27/2025    .0 02/27/2025    CREATSERUM 0.79 02/26/2025    BUN 5 (L) 02/26/2025     (L) 02/26/2025    K 4.0 02/26/2025     02/26/2025    CO2 20.0 (L) 02/26/2025     (H) 02/26/2025    CA 8.1 (L) 02/26/2025    ALB 3.6 02/26/2025    ALKPHO 92 02/26/2025    BILT 0.3 02/26/2025    TP 6.3 02/26/2025    AST 32 02/26/2025    ALT 19 02/26/2025    T4F 0.9 01/17/2025    TSH 2.154 01/17/2025     DISHA Farley under direct supervision and in collaboration with Caryl Ricketts CNM    Patient seen in conjunction with DISHA. I personally witnessed the patient's exam and medical decision making on this date of service.  I was physically present in the room for the performance of the E/M service.  I  have reviewed the FRANCY student's documentation and findings including history, Exam, and Medical Decision Making, edited the document for accuracy and verify that it represents the clinical findings and services performed.      Caryl Ricketts CNM  2/27/2025  10:10 AM

## 2025-02-27 NOTE — ANESTHESIA POSTPROCEDURE EVALUATION
Patient: Kitty Abrams    Procedure Summary       Date: 02/26/25 Room / Location:     Anesthesia Start: 1100 Anesthesia Stop: 2128    Procedure: LABOR ANALGESIA Diagnosis:     Scheduled Providers:  Anesthesiologist: Christiane Castellanos MD    Anesthesia Type: epidural ASA Status: 3            Anesthesia Type: epidural    Vitals Value Taken Time   /76 02/26/25 2216   Temp 99.3 °F (37.4 °C) 02/26/25 2200   Pulse 114 02/26/25 2216   Resp 16 02/26/25 2200   SpO2 96 % 02/26/25 2120   Vitals shown include unfiled device data.    [facility] AN Post Evaluation:   Patient Evaluated in floor  Patient Participation: complete - patient participated  Level of Consciousness: awake and alert  Pain Score: 0  Pain Management: adequate  Airway Patency:patent  Yes    Nausea/Vomiting: none  Cardiovascular Status: acceptable  Respiratory Status: acceptable  Postoperative Hydration acceptable      Christiane Castellanos MD  2/27/2025 1:19 AM

## 2025-02-27 NOTE — LACTATION NOTE
This note was copied from a baby's chart.  LACTATION NOTE - INFANT    Evaluation Type  Evaluation Type: Inpatient    Problems & Assessment  Problems Diagnosed or Identified: Disorganized suck;Infant feeding problem;Latch difficulty  Infant Assessment: Good skin turgor;Skin color: pink or appropriate for ethnicity;Minimal hunger cues present  Muscle tone: Appropriate for GA    Feeding Assessment  Summary Current Feeding: Adlib;Infant not latching to breast  Last 24 hour feeding summary: plan for pumped breastmilk with formula supplementation  Breastfeeding Assessment:  (mother declined direct breastfeeding at this time)     Mom started with electric breastpump.  Syringes given for collection of EBM.  Reviewed handouts in educational booklet about care of EBM.  Mother's desire is to not direct breastfeed at this time, but to pump and also supplement with formula         Equipment used  Equipment used: Bottle with slow flow nipple;Syringe

## 2025-02-27 NOTE — PLAN OF CARE
Problem: BIRTH - VAGINAL/ SECTION  Goal: Fetal and maternal status remain reassuring during the birth process  Description: INTERVENTIONS:  - Monitor vital signs  - Monitor fetal heart rate  - Monitor uterine activity  - Monitor labor progression (vaginal delivery)  - DVT prophylaxis (C/S delivery)  - Surgical antibiotic prophylaxis (C/S delivery)  Outcome: Progressing     Problem: PAIN - ADULT  Goal: Verbalizes/displays adequate comfort level or patient's stated pain goal  Description: INTERVENTIONS:  - Encourage pt to monitor pain and request assistance  - Assess pain using appropriate pain scale  - Administer analgesics based on type and severity of pain and evaluate response  - Implement non-pharmacological measures as appropriate and evaluate response  - Consider cultural and social influences on pain and pain management  - Manage/alleviate anxiety  - Utilize distraction and/or relaxation techniques  - Monitor for opioid side effects  - Notify MD/LIP if interventions unsuccessful or patient reports new pain  - Anticipate increased pain with activity and pre-medicate as appropriate  Outcome: Progressing     Problem: ANXIETY  Goal: Will report anxiety at manageable levels  Description: INTERVENTIONS:  - Administer medication as ordered  - Teach and rehearse alternative coping skills  - Provide emotional support with 1:1 interaction with staff  Outcome: Progressing     Problem: Patient Centered Care  Goal: Patient preferences are identified and integrated in the patient's plan of care  Description: Interventions:  - What would you like us to know as we care for you? Patient is a first time mom, having a baby girl, plans to breast feed and would like an epidural for pain management  - Provide timely, complete, and accurate information to patient/family  - Incorporate patient and family knowledge, values, beliefs, and cultural backgrounds into the planning and delivery of care  - Encourage patient/family  to participate in care and decision-making at the level they choose  - Honor patient and family perspectives and choices  Outcome: Progressing     Problem: Patient/Family Goals  Goal: Patient/Family Long Term Goal  Description: Patient's Long Term Goal: Uncomplicated Delivery     Interventions:  - Assessment/Monitoring  - Induction/Augmentation per protocol and Provider order  - C/S per protocol and Provider order   - Education  - Intervention per protocol and Provider order with education   - Involve patient in POC  - See additional Care Plan goals for specific interventions     Outcome: Progressing  Goal: Patient/Family Short Term Goal  Description: Patient's Short Term Goal: Comfort and Pain Control     Interventions:   - Non Pharmacological pain intervention   - IV/IM and Epidural pain medication per Provider order and patient request  - Education  - Involve Patient in POC   - See additional Care Plan goals for specific interventions       Outcome: Progressing

## 2025-02-27 NOTE — PROGRESS NOTES
[facility]  part of [facility]    Labor Progress Note    Kityt Abrams Patient Status:  Inpatient    5/10/1990 MRN [id number]   Location [facility] Attending Kristina Molina CNM   Hosp Day # 1 PCP Luis A Kenny MD     Subjective:   Interval History:   Intermittent mild pressure.     Objective:   Vital signs in last 24 hours:  Temp:  [98.2 °F (36.8 °C)-102 °F (38.9 °C)] 102 °F (38.9 °C)  Pulse:  [110-177] 143  Resp:  [18-19] 18  BP: (115-153)/() 134/61  SpO2:  [92 %-100 %] 98 %    Input/Output:  No intake or output data in the 24 hours ending 25    Fetal Surveillance:  Fetal heart tones:  150, moderate variability, periods of minimal variability, + accels, no decels  Uterine contractions:  4-5 minutes apart    Sterile vaginal exam:  Dilation: 9 cm dilation   Effacement: 100%   Station: +1   Position: Cephalic  Presentation: vertex    Results:   Lab Results   Component Value Date    TREPONEMALAB Nonreactive 2025    ABO O 2025    RH Positive 2025    WBC 8.8 2025    HGB 10.9 (L) 2025    HCT 32.6 (L) 2025    .0 2025    CREATSERUM 0.79 2025    BUN 5 (L) 2025     (L) 2025    K 4.0 2025     2025    CO2 20.0 (L) 2025     (H) 2025    CA 8.1 (L) 2025    ALB 3.6 2025    ALKPHO 92 2025    BILT 0.3 2025    TP 6.3 2025    AST 32 2025    ALT 19 2025    T4F 0.9 2025    TSH 2.154 2025       Lab Results   Component Value Date    COLORUR Colorless (A) 2024    CLARITY Clear 2024    SPECGRAVITY 1.010 2025    PROUR Negative 2024    GLUUR Normal 2024    KETUR Negative 2024    BILUR Negative 2024    BLOODURINE Negative 2024    NITRITE Negative 2025    UROBILINOGEN Normal 2024    LEUUR 75 (A) 2024       Assessment & Plan:      Kitty  is a 34 y.o.  at 40w1d by embryo transfer admitted yesterday for IOL  Category 1 FHR   GBS negative          Encounter for elective induction of labor ([facility])  -- S/P Cooks catheter.   --Now with epidural infusing  -SROM @ 0610, now noted thin meconium   --FHT now with Tachycardia and late decels resolved. Attempted to push past cervix however unable to push past. FWB reassuring currently. Will restart Pitocin and continue to monitor closely. Dr Snow updated and in agreement with plan.        Influenza A  -- Started on Tamiflu. Receiving Tylenol & Zofran       Tachycardia  -- Due to febrile illness/Influenza, currently 110's       Mild pre-eclampsia  -- B/P's WNL to mildly elevated  --Borderline elevated AST on admit, repeat normal  --P/C ratio 0.52  --Dr Snow aware and on consult          Pregnancy conceived using assisted reproductive technology (ART) ([facility])  -- Due to male infertility  -- Normal AP testing       Obesity in pregnancy ([facility])  -- Pre-pregnancy BMI 32.62  -- Total weight gain in pregnancy 37 lbs       Asthma ([facility])  -- Last used inhaler last night due to cough  -- Inhaler ordered to bedside       Anxiety & Depression  -- Stable on sertraline 75mg       Post-traumatic stress disorder, chronic  -- Best friend  during labor from epidural complications, pt very fearful of epidural but has been working through it in therapy         Plan discussed with patient who verbalizes understanding and agreement.    Natividad Pina CNM  2025

## 2025-02-27 NOTE — L&D DELIVERY NOTE
[facility]  part of [facility]    Vaginal Delivery Note    Kitty Abrams Patient Status:  Inpatient    5/10/1990 MRN [id number]   Location [facility] Attending Kristina Molina CNM   Hosp Day # 1 PCP Luis A Kenny MD     Delivery     Infant  Date of Delivery: 2025   Time of Delivery: 9:23 PM  Delivery Type: Normal spontaneous vaginal delivery    Infant Sex/Birthweight: female 7 lb 14.3 oz (3.58 kg)    Presentation Vertex [1]  Position   Occiput [1] Anterior [1]    Apgars:  1 minute: 9               5 minutes: 9                        10 minutes:      Placenta  Date/Time of Delivery: 2025  9:28 PM   Delivery: spontaneous  Placenta to Pathology: yes  Cord Gases Submitted: no  Cord Blood Collection: yes  Cord Tissue Collection: no  Cord Complications: none  Sponge and Needle Counts:  Verified yes    Maternal Anesthesia: epidural   Episiotomy/Laceration Repair  Laceration: perineal 2nd degree  Location: midline  Suture Size/Type: 3-0 Chromic  Anesthesia: no additional    Delivery Complications  none    Neonatologist Present: yes  Delivery Comment: Kitty progressed to complete dilatation and +2 station prior to pushing successfully to viable baby girl. See Delivery Summary for time, APGARs, and weight. Fetal head presented in the OA position. Sweep for nuchal cord was performed and no nuchal cord identified. Anterior shoulder delivered spontaneously without traction. Baby vigorous at birth. To maternal abdomen for dry and stimulation then cord cut after pulsing ceased. Baby then brought to warmer with Amado and RN present. Prophylactic IV pitocin initiated in 3rd stage. Spontaneous placenta by leblanc mechanism, complete with 3 vessel cord. Hemostasis achieved by fundal massage and prophylactic IV Pitocin which was increased to 600 ml/hr, cytotec 1000 mcg per rectum and sweep of clots from cervix. Vagina and perineum inspected and small 2nd degree  perineal laceration noted. Repaired in usual fashion. Periurethral laceration hemostatic and not in need of repair.. Mother and infant appeared in stable condition when midwife left the delivery room. Sharps and 4x4 counts were correct. Skin to skin initiated.    Quantitative Blood Loss (mL)  pending       Natividad Pina CNM   2/26/2025  10:09 PM

## 2025-02-27 NOTE — LACTATION NOTE
LACTATION NOTE - MOTHER      Evaluation Type: Inpatient    Problems identified  Problems identified: Knowledge deficit;Unable to acheive sustained latch    Maternal history  Maternal history: Anxiety;Depression;PIH    Breastfeeding goal  Breastfeeding goal: To maintain breast milk feeding per patient goal    Maternal Assessment  Bilateral Breasts: Soft;Elastic;Symmetrical  Bilateral Nipples: Slightly everted/short;Colostrum easily expressed;Sore  Prior breastfeeding experience (comment below): Primip  Breastfeeding Assistance: Pumping assistance provided with permission    Pain assessment  Pain, additional: Pain w/pumping  Location/Comment: sore nipple from previous latch, turned suction settings down to inprove comfort    Guidelines for use of:  Breast pump type: Bren Art  Suggested use of pump: Pump 8-12X/24hr;For comfort as needed;Pump each time a supplement is offered;Pump if infant is not latching to breast     LC assistance offered.  Pt would like to use an electric pump and then supplement with bottles of EBM and formula and is not interested with putting infant to breast at this time. Pt has a Spectra pump at home and was using before delivery. Pt encouraged to seek out LC and RN support if her desire changes.  Information given about outpatient lactation services.

## 2025-02-28 NOTE — DISCHARGE SUMMARY
[facility]  part of [facility]    Discharge Summary    Kitty Abrams Patient Status:  Inpatient    5/10/1990 MRN [id number]   Location [facility] 3SE Attending Kristina Molina CNM   Hosp Day # 3       Delivering OB Clinician: Natividad Pina CNM    EDC: Estimated Date of Delivery: 25    Gestational Age: 40w1d    Antepartum complications:   Patient Active Problem List   Diagnosis    Asthma ([facility])    Pregnancy conceived using assisted reproductive technology (ART) ([facility])    Anxiety    Post-traumatic stress disorder, chronic    Obesity in pregnancy ([facility])    Elevated TSH    Allergy to amoxicillin    Major depressive disorder, single episode, unspecified    Pregnant ([facility])    40 weeks gestation of pregnancy ([facility])    Encounter for elective induction of labor ([facility])    Influenza A    Tachycardia    Elevated BP without diagnosis of hypertension    Encounter for induction of labor ([facility])    Mild pre-eclampsia in third trimester ([facility])     (normal spontaneous vaginal delivery) ([facility])    Postpartum anemia ([facility])       Date of Delivery: 2025 Time of Delivery: 9:23 PM    Delivery Type: spontaneous vaginal delivery    Baby: Liveborn female   Information for the patient's :  Keny Abrams [[id number]]   7 lb 14.3 oz (3.58 kg)  Apgars:  1 minute: 9  5 minutes: 910 minutes:       Intrapartum Complications: Pre-eclampsia and influenza    Admit Date: 2025    Discharge Date: 2025    Hospital Course: No complications. Routine delivery and postpartum care    Discharged Condition: stable    Disposition: home    Plan:     Follow-up appointment in 2-3 days for blood pressure check with RN and in 2 weeks with FRANCY Denny CNM  2025  8:34 AM

## 2025-02-28 NOTE — PLAN OF CARE
Problem: Patient Centered Care  Goal: Patient preferences are identified and integrated in the patient's plan of care  Description: Interventions:  - What would you like us to know as we care for you? Patient is a first time mom, having a baby girl, plans to breast feed and would like an epidural for pain management  - Provide timely, complete, and accurate information to patient/family  - Incorporate patient and family knowledge, values, beliefs, and cultural backgrounds into the planning and delivery of care  - Encourage patient/family to participate in care and decision-making at the level they choose  - Honor patient and family perspectives and choices  Outcome: Progressing     Problem: Patient/Family Goals  Goal: Patient/Family Long Term Goal  Description: Patient's Long Term Goal: Uncomplicated Delivery     Interventions:  - Assessment/Monitoring  - Induction/Augmentation per protocol and Provider order  - C/S per protocol and Provider order   - Education  - Intervention per protocol and Provider order with education   - Involve patient in POC  - See additional Care Plan goals for specific interventions     Outcome: Progressing  Goal: Patient/Family Short Term Goal  Description: Patient's Short Term Goal: Comfort and Pain Control     Interventions:   - Non Pharmacological pain intervention   - IV/IM and Epidural pain medication per Provider order and patient request  - Education  - Involve Patient in POC   - See additional Care Plan goals for specific interventions       Outcome: Progressing     Problem: POSTPARTUM  Goal: Long Term Goal:Experiences normal postpartum course  Description: INTERVENTIONS:  - Assess and monitor vital signs and lab values.  - Assess fundus and lochia.  - Provide ice/sitz baths for perineum discomfort.  - Monitor healing of incision/episiotomy/laceration, and assess for signs and symptoms of infection and hematoma.  - Assess bladder function and monitor for bladder distention.  -  Provide/instruct/assist with pericare as needed.  - Provide VTE prophylaxis as needed.  - Monitor bowel function.  - Encourage ambulation and provide assistance as needed.  - Assess and monitor emotional status and provide social service/psych resources as needed.  - Utilize standard precautions and use personal protective equipment as indicated. Ensure aseptic care of all intravenous lines and invasive tubes/drains.  - Obtain immunization and exposure to communicable diseases history.  Outcome: Progressing  Goal: Optimize infant feeding at the breast  Description: INTERVENTIONS:  - Initiate breast feeding within first hour after birth.   - Monitor effectiveness of current breast feeding efforts.  - Assess support systems available to mother/family.  - Identify cultural beliefs/practices regarding lactation, letdown techniques, maternal food preferences.  - Assess mother's knowledge and previous experience with breast feeding.  - Provide information as needed about early infant feeding cues (e.g., rooting, lip smacking, sucking fingers/hand) versus late cue of crying.  - Discuss/demonstrate breast feeding aids (e.g., infant sling, nursing footstool/pillows, and breast pumps).  - Encourage mother/other family members to express feelings/concerns, and actively listen.  - Educate father/SO about benefits of breast feeding and how to manage common lactation challenges.  - Recommend avoidance of specific medications or substances incompatible with breast feeding.  - Assess and monitor for signs of nipple pain/trauma.  - Instruct and provide assistance with proper latch.  - Review techniques for milk expression (breast pumping) and storage of breast milk. Provide pumping equipment/supplies, instructions and assistance, as needed.  - Encourage rooming-in and breast feeding on demand.  - Encourage skin-to-skin contact.  - Provide LC support as needed.  - Assess for and manage engorgement.  - Provide breast feeding education  handouts and information on community breast feeding support.   Outcome: Progressing  Goal: Establishment of adequate milk supply with medication/procedure interruptions  Description: INTERVENTIONS:  - Review techniques for milk expression (breast pumping).   - Provide pumping equipment/supplies, instructions, and assistance until it is safe to breastfeed infant.  Outcome: Progressing  Goal: Experiences normal breast weaning course  Description: INTERVENTIONS:  - Assess for and manage engorgement.  - Instruct on breast care.  - Provide comfort measures.  Outcome: Progressing  Goal: Appropriate maternal -  bonding  Description: INTERVENTIONS:  - Assess caregiver- interactions.  - Assess caregiver's emotional status and coping mechanisms.  - Encourage caregiver to participate in  daily care.  - Assess support systems available to mother/family.  - Provide /case management support as needed.  Outcome: Progressing     Problem: RESPIRATORY - ADULT  Goal: Achieves optimal ventilation and oxygenation  Description: INTERVENTIONS:  - Assess for changes in respiratory status  - Assess for changes in mentation and behavior  - Position to facilitate oxygenation and minimize respiratory effort  - Oxygen supplementation based on oxygen saturation or ABGs  - Provide Smoking Cessation handout, if applicable  - Encourage broncho-pulmonary hygiene including cough, deep breathe, Incentive Spirometry  - Assess the need for suctioning and perform as needed  - Assess and instruct to report SOB or any respiratory difficulty  - Respiratory Therapy support as indicated  - Manage/alleviate anxiety  - Monitor for signs/symptoms of CO2 retention  Outcome: Progressing     VSS, afebrile. Voiding freely. Fundus is firm, U/U, bleeding is scant. Plan of care reviewed with pt. Questions and concerns answered. Bonding well with baby. Will continue with plan of care.

## 2025-02-28 NOTE — PROGRESS NOTES
[facility]  part of [facility]    OB/GYNE Progress Note      Kitty Abrams Patient Status:  Inpatient    5/10/1990 MRN [id number]   Location [facility] 3SE Attending Kristina Molina CNM   Hosp Day # 3 PCP Luis A Kenny MD        Assessment/Plan   Kitty Abrams is a 34 year old , day 1 after a vaginal delivery  Feeding: formula feeding and pumping   Discharge home anticipated this afternoon  Postpartum teaching: danger signs and pp expectations       (normal spontaneous vaginal delivery) ([facility])  Stable PP day 2      Postpartum Anemia ([facility])  Admission Hgb 10.9 down to 8.8 today  Received IV venofer      Asthma ([facility])  Stable  Inhaler at bedside      Pregnancy conceived using assisted reproductive technology (ART) ([facility])  Delivered      Anxiety  Maintained on Sertraline 75mg daily       Post-traumatic stress disorder, chronic  Related to epidurals  Patient did get an epidural in labor and is pleased with this choice      Obesity in pregnancy ([facility])  Delivered      Major depressive disorder, single episode, unspecified  Maintained on Sertraline 75mg daily   Sees therapist regularly, has postpartum plan for follow up      40 weeks gestation of pregnancy ([facility])  Delivered      Influenza A  Treated with Tamiflu  Symptoms improving, aware of isolation x 7 days  Staying masked and only visitor is her        Tachycardia  Has returned to normal range      Encounter for induction of labor ([facility])  Delivered      Mild pre-eclampsia in third trimester ([facility])  B/P's WNL to mildly elevated  Borderline elevated AST on admit, repeat normal  P/C ratio 0.52  Asymptomatic  To check BP at home twice daily  Blood pressure check on Monday    Discussed with: nurse     patient     Plan discussed with patient who verbalizes understanding and agreement.        Subjective   Currently feeling better.   Baby's feeding: Formula feeding and she is pumping with manual pump  Support at  home:   Car seat: has      Review of Systems:  Constitutional: Pain well managed, ambulating without difficulty  Genitourinary: voiding without difficulty   Respiratory: denies shortness of breath or chest pain  Psychiatric: denies        Objective   Vital signs in last 24 hours:  Temp:  [97.7 °F (36.5 °C)-98.2 °F (36.8 °C)] 97.7 °F (36.5 °C)  Pulse:  [56-77] 57  Resp:  [16] 16  BP: (117-137)/(64-96) 136/96    Input/Output:  No intake or output data in the 24 hours ending 02/28/25 0831       Exam:  Constitutional: alert and pleasant, bonding well with baby  Uterus: fundus firm U/2, lochia rubra, small with no clots  Wound/Incision: perineum well approximated with little swelling  Respiratory:  effort normal  Extremities: No evidence of DVT on exam  Psychiatric: mood stable        Results:     Lab Results   Component Value Date    TREPONEMALAB Nonreactive 02/25/2025    ABO O 02/25/2025    RH Positive 02/25/2025    WBC 12.8 (H) 02/27/2025    HGB 8.8 (L) 02/27/2025    HCT 26.6 (L) 02/27/2025    .0 02/27/2025    CREATSERUM 0.79 02/26/2025    BUN 5 (L) 02/26/2025     (L) 02/26/2025    K 4.0 02/26/2025     02/26/2025    CO2 20.0 (L) 02/26/2025     (H) 02/26/2025    CA 8.1 (L) 02/26/2025    ALB 3.6 02/26/2025    ALKPHO 92 02/26/2025    BILT 0.3 02/26/2025    TP 6.3 02/26/2025    AST 32 02/26/2025    ALT 19 02/26/2025    T4F 0.9 01/17/2025    TSH 2.154 01/17/2025

## 2025-02-28 NOTE — PLAN OF CARE
Problem: Patient Centered Care  Goal: Patient preferences are identified and integrated in the patient's plan of care  Description: Interventions:  - What would you like us to know as we care for you? Patient is a first time mom, having a baby girl, plans to breast feed and would like an epidural for pain management  - Provide timely, complete, and accurate information to patient/family  - Incorporate patient and family knowledge, values, beliefs, and cultural backgrounds into the planning and delivery of care  - Encourage patient/family to participate in care and decision-making at the level they choose  - Honor patient and family perspectives and choices  Outcome: Completed     Problem: Patient/Family Goals  Goal: Patient/Family Long Term Goal  Description: Patient's Long Term Goal: Uncomplicated Delivery     Interventions:  - Assessment/Monitoring  - Induction/Augmentation per protocol and Provider order  - C/S per protocol and Provider order   - Education  - Intervention per protocol and Provider order with education   - Involve patient in POC  - See additional Care Plan goals for specific interventions     Outcome: Completed  Goal: Patient/Family Short Term Goal  Description: Patient's Short Term Goal: Comfort and Pain Control     Interventions:   - Non Pharmacological pain intervention   - IV/IM and Epidural pain medication per Provider order and patient request  - Education  - Involve Patient in POC   - See additional Care Plan goals for specific interventions       Outcome: Completed     Problem: POSTPARTUM  Goal: Long Term Goal:Experiences normal postpartum course  Description: INTERVENTIONS:  - Assess and monitor vital signs and lab values.  - Assess fundus and lochia.  - Provide ice/sitz baths for perineum discomfort.  - Monitor healing of incision/episiotomy/laceration, and assess for signs and symptoms of infection and hematoma.  - Assess bladder function and monitor for bladder distention.  -  Provide/instruct/assist with pericare as needed.  - Provide VTE prophylaxis as needed.  - Monitor bowel function.  - Encourage ambulation and provide assistance as needed.  - Assess and monitor emotional status and provide social service/psych resources as needed.  - Utilize standard precautions and use personal protective equipment as indicated. Ensure aseptic care of all intravenous lines and invasive tubes/drains.  - Obtain immunization and exposure to communicable diseases history.  Outcome: Completed  Goal: Optimize infant feeding at the breast  Description: INTERVENTIONS:  - Initiate breast feeding within first hour after birth.   - Monitor effectiveness of current breast feeding efforts.  - Assess support systems available to mother/family.  - Identify cultural beliefs/practices regarding lactation, letdown techniques, maternal food preferences.  - Assess mother's knowledge and previous experience with breast feeding.  - Provide information as needed about early infant feeding cues (e.g., rooting, lip smacking, sucking fingers/hand) versus late cue of crying.  - Discuss/demonstrate breast feeding aids (e.g., infant sling, nursing footstool/pillows, and breast pumps).  - Encourage mother/other family members to express feelings/concerns, and actively listen.  - Educate father/SO about benefits of breast feeding and how to manage common lactation challenges.  - Recommend avoidance of specific medications or substances incompatible with breast feeding.  - Assess and monitor for signs of nipple pain/trauma.  - Instruct and provide assistance with proper latch.  - Review techniques for milk expression (breast pumping) and storage of breast milk. Provide pumping equipment/supplies, instructions and assistance, as needed.  - Encourage rooming-in and breast feeding on demand.  - Encourage skin-to-skin contact.  - Provide LC support as needed.  - Assess for and manage engorgement.  - Provide breast feeding education  handouts and information on community breast feeding support.   Outcome: Completed  Goal: Establishment of adequate milk supply with medication/procedure interruptions  Description: INTERVENTIONS:  - Review techniques for milk expression (breast pumping).   - Provide pumping equipment/supplies, instructions, and assistance until it is safe to breastfeed infant.  Outcome: Completed  Goal: Experiences normal breast weaning course  Description: INTERVENTIONS:  - Assess for and manage engorgement.  - Instruct on breast care.  - Provide comfort measures.  Outcome: Completed  Goal: Appropriate maternal -  bonding  Description: INTERVENTIONS:  - Assess caregiver- interactions.  - Assess caregiver's emotional status and coping mechanisms.  - Encourage caregiver to participate in  daily care.  - Assess support systems available to mother/family.  - Provide /case management support as needed.  Outcome: Completed     Problem: RESPIRATORY - ADULT  Goal: Achieves optimal ventilation and oxygenation  Description: INTERVENTIONS:  - Assess for changes in respiratory status  - Assess for changes in mentation and behavior  - Position to facilitate oxygenation and minimize respiratory effort  - Oxygen supplementation based on oxygen saturation or ABGs  - Provide Smoking Cessation handout, if applicable  - Encourage broncho-pulmonary hygiene including cough, deep breathe, Incentive Spirometry  - Assess the need for suctioning and perform as needed  - Assess and instruct to report SOB or any respiratory difficulty  - Respiratory Therapy support as indicated  - Manage/alleviate anxiety  - Monitor for signs/symptoms of CO2 retention  Outcome: Completed     Discharge order received from MD.  Postpartum folder given, discharge medication form reviewed, signed and given to patient. ID Band matched with baby band. Patient informed when to make a follow-up appointment with OB. Mother is interacting  appropriately with baby. Verbalized understanding of follow-up instructions. Discharged in stable condition via wheelchair.

## 2025-03-04 NOTE — TELEPHONE ENCOUNTER
Natividad,    Please sign off on form if you agree to: Disability/maternity leave    -Signature page will be the first page scanned  -From your Inbasket, Highlight the patient and click Chart   -Double click the 2/18/25 Forms Completion telephone encounter  -Scroll down to the Media section   -Click the blue Hyperlink: Natividad [place], 3/3/25  -Click Acknowledge located in the top right ribbon/menu   -Drag the mouse into the blank space of the document and a + sign will appear. Left click to   electronically sign the document.  -Once signed, simply exit out of the screen and you signature will be saved.     Thank you,  Ana ENAMORADO

## 2025-03-10 NOTE — PROGRESS NOTES
[facility]  Midwifery Focused Gynecology Problem Exam    Kitty Abrams is a 34 year old female presenting for No chief complaint on file.  .    HPI:   No chief complaint on file.    Here with c/o of burning, worried may have UTI. She called in and spoke with Hali Rollins CNM over the weekend and did a course of Bactrim with no relief.   Started 4 days after delivery.   Feels pressure when tries to pee and as finishes has severe burning. Also has burning when tries to have BM    S/p  on  with myself. Had 2nd degree perineal laceration which was repaired and a cuate-urethral laceration which was hemostatic and not repaired.     No fevers or chills. No back pain.     Bleeding light.     No fevers or chills.          PHQ-2 not done in last 12 months! Please administer and refresh!         Depression Screening (PHQ-2/PHQ-9): Over the LAST 2 WEEKS                         Medications (Active prior to today's visit):  Current Outpatient Medications   Medication Sig Dispense Refill    docusate sodium 100 MG Oral Cap Take 100 mg by mouth 2 (two) times daily. 30 capsule 0    ibuprofen 600 MG Oral Tab Take 1 tablet (600 mg total) by mouth every 6 (six) hours. 40 tablet 0    albuterol 108 (90 Base) MCG/ACT Inhalation Aero Soln Inhale 1 puff into the lungs every 6 (six) hours as needed for Wheezing. Patient reported      prenatal vitamin with DHA 27-0.8-228 MG Oral Cap Take 1 capsule by mouth daily.      sertraline 50 MG Oral Tab Take 1.5 tablets (75 mg total) by mouth daily.      Montelukast Sodium (SINGULAIR) 10 MG Oral Tab Take 1 Tab by mouth nightly. 30 Tab 4    WAL-FEX ALLERGY 180 MG Oral Tab TAKE 1 TABLET BY MOUTH DAILY 30 Tab 1    Fexofenadine HCl (ALLEGRA) 180 MG Oral Tab Take 1 Tab by mouth daily. 30 Tab 2     Allergies:  Allergies[1]  HISTORY:   Menstrual History:  OB History    Para Term  AB Living   1 1 1 0 0 1   SAB IAB Ectopic Multiple Live Births   0 0 0 0 1      No LMP  recorded.         Past Medical History:    ALLERGIC RHINITIS    ASTHMA    has been to ER infrequently for neb treatment (has nebulizer at home)    Depression    PTSD (post-traumatic stress disorder)     No past surgical history on file.  Family History   Problem Relation Age of Onset    Diabetes Father     Heart Attack Maternal Grandfather     Prostate Cancer Paternal Grandfather     Diabetes Maternal Grandfather     Diabetes Paternal Grandmother     Cancer Paternal Grandmother         renal carcinoma    Cancer Paternal Grandfather         prostate cancer    Other Sister         allergies    Diabetes Other      Social History     Socioeconomic History    Marital status:      Spouse name: Not on file    Number of children: Not on file    Years of education: Not on file    Highest education level: Not on file   Occupational History    Not on file   Tobacco Use    Smoking status: Never    Smokeless tobacco: Never   Substance and Sexual Activity    Alcohol use: No    Drug use: No    Sexual activity: Not on file   Other Topics Concern    Not on file   Social History Narrative    ** Merged History Encounter **          Social Drivers of Health     Food Insecurity: No Food Insecurity (2/25/2025)    NCSS - Food Insecurity     Worried About Running Out of Food in the Last Year: No     Ran Out of Food in the Last Year: No   Transportation Needs: No Transportation Needs (2/25/2025)    NCSS - Transportation     Lack of Transportation: No   Stress: No Stress Concern Present (2/25/2025)    Stress     Feeling of Stress : No   Housing Stability: Not At Risk (2/25/2025)    NCSS - Housing/Utilities     Has Housing: Yes     Worried About Losing Housing: No     Unable to Get Utilities: No       ROS:   Review of Systems   Constitutional: Negative.    Gastrointestinal: Negative.    Genitourinary:  Positive for dysuria, vaginal bleeding and vaginal pain. Negative for decreased urine volume, difficulty urinating, dyspareunia,  enuresis, flank pain, frequency, genital sores, hematuria, menstrual problem, pelvic pain, urgency and vaginal discharge.        PHYSICAL EXAM:   /78   Pulse 75   Ht 5' 9\" (1.753 m)   Wt 239 lb (108.4 kg)   Breastfeeding No   BMI 35.29 kg/m²   Physical Exam  Constitutional:       General: She is not in acute distress.     Appearance: Normal appearance. She is not ill-appearing.   Pulmonary:      Effort: Pulmonary effort is normal.   Genitourinary:         Comments: Small healing abrasions in periurethral area. Area tender to touch with q-tip    Perineum well approximated. Small area slightly open/raw, but approximates well  Skin:     General: Skin is warm and dry.   Neurological:      Mental Status: She is alert and oriented to person, place, and time.   Psychiatric:         Mood and Affect: Mood normal.         Behavior: Behavior normal.         Thought Content: Thought content normal.        Component      Latest Ref Rng 3/11/2025   Color Urine      Yellow  Colorless !    Clarity Urine      Clear  Clear    Spec Gravity      1.005 - 1.030  1.005    Glucose Urine      Normal mg/dL Normal    Bilirubin Urine      Negative  Negative    Ketones, UA      Negative mg/dL Negative    Blood Urine      Negative  3+ !    PH Urine      5.0 - 8.0  6.0    Protein Urine      Negative mg/dL Negative    Urobilinogen Urine      Normal  Normal    Nitrite Urine      Negative  Negative    Leukocyte Esterase       Negative  250 !    WBC Urine      0 - 5 /HPF 11-20 !    RBC Urine      0 - 2 /HPF 0-2    Bacteria Urine      None Seen /HPF None Seen    SQUAM EPI CELLS UR      None Seen /HPF Few !    RENAL TUBULAR EPITHELIAL CELLS      None Seen /HPF None Seen    TRANSITIONAL EPI CELLS      None Seen /HPF None Seen    YEAST URINE      None Seen /HPF None Seen       Legend:  ! Abnormal     ASSESSMENT:    Diagnoses and all orders for this visit:    UTI symptoms  -     Urinalysis, Routine; Future  -     Urine Culture, Routine;  Future  -     POC Urinalysis, Manual Dip without microscopy [[id number]]    Vaginal pain      UA dip dirty but is bleeding PP. Pain with touch in area of periurethral lacerations which are healing. Pain likely d/t healing of lacerations. Will send culture as well to r/o infection. Recommend using peribottle when urinates to help dilute urine from hitting area and dab only with toilet paper, do not wipe. Can also use aquaphor as a barrier.     Natividad Pina CNM  3/11/2025  11:40 AM                  [1]   Allergies  Allergen Reactions    Amoxicillin HIVES    Doxycycline HIVES    Amoxicillin-Na Benzoate RASH

## 2025-03-10 NOTE — TELEPHONE ENCOUNTER
Patient continues to have burning when urinating. Has almost finished recent prescription. Please call to discuss.

## 2025-03-10 NOTE — TELEPHONE ENCOUNTER
Pt verified name and .     Pt reports UTI symptoms have not resolved after taking antibiotic rx. Pt states that symptoms have gotten a little better but they are still experiencing burning with urination. Advised that pt be seen in office for further evaluation. Pt verbalized understanding and agreed. Pt scheduled for 2 week virtual postpartum visit tomorrow. Pt requesting that appt be changed to in-office. Appointment updated to in-office visit. Pt aware of scheduling details.

## 2025-04-22 NOTE — PROGRESS NOTES
Patient called with uti symptoms x 3 days. Burning and pressure. Some abdominal pain no nausea no back pain. Bleeding is minimal. Postpartum and not breastfeeding. Confirmed allergies. Rx sent to pharmacy on [address] in [place]. Advised to page if develops fever nausea back pain. Call if symptoms are not improving in the next 2 days.  Patient/Family given demonstration and verbal instruction on self administration of  the MDI via spacer for home use. Patient also received a spacer for home use, and verbalized understanding of technique.       Felipe Guerin, RT

## (undated) NOTE — LETTER
[facility]  Administration of Anesthesia  IKitty agree to be cared for by a physician anesthesiologist alone and/or with a nurse anesthetist, who is specially trained to monitor me and give me medicine to put me to sleep or keep me comfortable during my procedure    I understand that my anesthesiologist and/or anesthetist is not an employee or agent of [facility] or [employer]. He or she works for [facility]    As the patient asking for anesthesia services, I agree to:  Allow the anesthesiologist (anesthesia doctor) to give me medicine and do additional procedures as necessary. Some examples are: Starting or using an “IV” to give me medicine, fluids or blood during my procedure, and having a breathing tube placed to help me breathe when I’m asleep (intubation). In the event that my heart stops working properly, I understand that my anesthesiologist will make every effort to sustain my life, unless otherwise directed by [facility] Do Not Resuscitate documents.  Tell my anesthesia doctor before my procedure:  If I am pregnant.  The last time that I ate or drank.  iii. All of the medicines I take (including prescriptions, herbal supplements, and pills I can buy without a prescription (including street drugs/illegal medications). Failure to inform my anesthesiologist about these medicines may increase my risk of anesthetic complications.  iv.If I am allergic to anything or have had a reaction to anesthesia before.  I understand how the anesthesia medicine will help me (benefits).  I understand that with any type of anesthesia medicine there are risks:  The most common risks are: nausea, vomiting, sore throat, muscle soreness, damage to my eyes, mouth, or teeth (from breathing tube placement).  Rare risks include: remembering what happened during my procedure, allergic reactions to medications, injury to my airway, heart, lungs, vision, nerves,  or muscles and in extremely rare instances death.  My doctor has explained to me other choices available to me for my care (alternatives).  Pregnant Patients (“epidural”):  I understand that the risks of having an epidural (medicine given into my back to help control pain during labor), include itching, low blood pressure, difficulty urinating, headache or slowing of the baby’s heart. Very rare risks include infection, bleeding, seizure, irregular heart rhythms and nerve injury.  Regional Anesthesia (“spinal”, “epidural”, & “nerve blocks”):  I understand that rare but potential complications include headache, bleeding, infection, seizure, irregular heart rhythms, and nerve injury.    _____________________________________________________________________________  Patient (or Representative) Signature/Relationship to Patient  Date   Time    _____________________________________________________________________________   Name (if used)    Language/Organization   Time    _____________________________________________________________________________  Nurse Anesthetist Signature     Date   Time  _____________________________________________________________________________  Anesthesiologist Signature     Date   Time  I have discussed the procedure and information above with the patient (or patient’s representative) and answered their questions. The patient or their representative has agreed to have anesthesia services.    _____________________________________________________________________________  Witness        Date   Time  I have verified that the signature is that of the patient or patient’s representative, and that it was signed before the procedure  Patient Name: Kitty Boykinkip     : 5/10/1990                 Printed: 2025 at 2:49 PM    Medical Record #: [id number]                                            Page 1 of 1  ----------ANESTHESIA CONSENT----------